# Patient Record
Sex: MALE | Race: OTHER | HISPANIC OR LATINO | Employment: FULL TIME | URBAN - METROPOLITAN AREA
[De-identification: names, ages, dates, MRNs, and addresses within clinical notes are randomized per-mention and may not be internally consistent; named-entity substitution may affect disease eponyms.]

---

## 2023-03-20 PROBLEM — E03.8 SUBCLINICAL HYPOTHYROIDISM: Status: ACTIVE | Noted: 2023-03-20

## 2023-03-20 PROBLEM — R06.02 SHORTNESS OF BREATH: Status: ACTIVE | Noted: 2023-03-20

## 2023-03-20 PROBLEM — I42.9 CARDIOMYOPATHY, UNSPECIFIED (MULTI): Status: ACTIVE | Noted: 2023-03-20

## 2023-03-20 PROBLEM — I51.3 ATRIAL THROMBUS: Status: ACTIVE | Noted: 2023-03-20

## 2023-03-20 PROBLEM — E55.9 VITAMIN D DEFICIENCY: Status: ACTIVE | Noted: 2023-03-20

## 2023-03-20 PROBLEM — I50.9 CONGESTIVE HEART FAILURE (MULTI): Status: ACTIVE | Noted: 2023-03-20

## 2023-03-20 PROBLEM — R09.82 PND (POST-NASAL DRIP): Status: ACTIVE | Noted: 2023-03-20

## 2023-03-20 PROBLEM — I48.92 ATRIAL FLUTTER (MULTI): Status: ACTIVE | Noted: 2023-03-20

## 2023-03-20 PROBLEM — E66.3 OVERWEIGHT WITH BODY MASS INDEX (BMI) OF 27 TO 27.9 IN ADULT: Status: ACTIVE | Noted: 2023-03-20

## 2023-03-20 PROBLEM — K46.9 ABDOMINAL HERNIA: Status: ACTIVE | Noted: 2023-03-20

## 2023-03-20 PROBLEM — I25.10 CORONARY ARTERY DISEASE: Status: ACTIVE | Noted: 2023-03-20

## 2023-03-20 PROBLEM — I10 PRIMARY HYPERTENSION: Status: ACTIVE | Noted: 2023-03-20

## 2023-03-20 PROBLEM — E78.5 HYPERLIPIDEMIA, UNSPECIFIED: Status: ACTIVE | Noted: 2023-03-20

## 2023-03-20 PROBLEM — I26.99 PULMONARY EMBOLISM (MULTI): Status: ACTIVE | Noted: 2023-03-20

## 2023-03-20 RX ORDER — FUROSEMIDE 40 MG/1
1 TABLET ORAL DAILY
COMMUNITY
End: 2023-10-23 | Stop reason: ALTCHOICE

## 2023-03-20 RX ORDER — ERGOCALCIFEROL 1.25 MG/1
1.25 CAPSULE ORAL
COMMUNITY

## 2023-03-20 RX ORDER — METOPROLOL SUCCINATE 200 MG/1
1 TABLET, EXTENDED RELEASE ORAL DAILY
COMMUNITY
End: 2024-04-11 | Stop reason: SDUPTHER

## 2023-03-20 RX ORDER — ROSUVASTATIN CALCIUM 20 MG/1
20 TABLET, COATED ORAL DAILY
COMMUNITY
End: 2023-10-19 | Stop reason: HOSPADM

## 2023-03-20 RX ORDER — SACUBITRIL AND VALSARTAN 49; 51 MG/1; MG/1
1 TABLET, FILM COATED ORAL 2 TIMES DAILY
COMMUNITY
End: 2024-02-05 | Stop reason: WASHOUT

## 2023-03-20 RX ORDER — LEVOTHYROXINE SODIUM 25 UG/1
25 TABLET ORAL DAILY
COMMUNITY
End: 2023-07-27 | Stop reason: SDUPTHER

## 2023-03-20 RX ORDER — FLUTICASONE PROPIONATE 50 MCG
2 SPRAY, SUSPENSION (ML) NASAL DAILY PRN
COMMUNITY
End: 2023-10-23 | Stop reason: ALTCHOICE

## 2023-03-21 ENCOUNTER — OFFICE VISIT (OUTPATIENT)
Dept: PRIMARY CARE | Facility: CLINIC | Age: 58
End: 2023-03-21
Payer: COMMERCIAL

## 2023-03-21 ENCOUNTER — APPOINTMENT (OUTPATIENT)
Dept: PRIMARY CARE | Facility: CLINIC | Age: 58
End: 2023-03-21
Payer: COMMERCIAL

## 2023-03-21 VITALS
WEIGHT: 184.8 LBS | DIASTOLIC BLOOD PRESSURE: 84 MMHG | TEMPERATURE: 98.3 F | SYSTOLIC BLOOD PRESSURE: 146 MMHG | HEART RATE: 80 BPM | OXYGEN SATURATION: 95 %

## 2023-03-21 DIAGNOSIS — J18.9 COMMUNITY ACQUIRED PNEUMONIA OF LEFT LOWER LOBE OF LUNG: ICD-10-CM

## 2023-03-21 DIAGNOSIS — I50.9 ACUTE ON CHRONIC CONGESTIVE HEART FAILURE, UNSPECIFIED HEART FAILURE TYPE (MULTI): ICD-10-CM

## 2023-03-21 DIAGNOSIS — J90 PLEURAL EFFUSION, BILATERAL: Primary | ICD-10-CM

## 2023-03-21 DIAGNOSIS — R09.89 BILATERAL RALES: ICD-10-CM

## 2023-03-21 DIAGNOSIS — I10 PRIMARY HYPERTENSION: ICD-10-CM

## 2023-03-21 PROCEDURE — 1036F TOBACCO NON-USER: CPT | Performed by: NURSE PRACTITIONER

## 2023-03-21 PROCEDURE — 3079F DIAST BP 80-89 MM HG: CPT | Performed by: NURSE PRACTITIONER

## 2023-03-21 PROCEDURE — 99213 OFFICE O/P EST LOW 20 MIN: CPT | Performed by: NURSE PRACTITIONER

## 2023-03-21 PROCEDURE — 3077F SYST BP >= 140 MM HG: CPT | Performed by: NURSE PRACTITIONER

## 2023-03-21 RX ORDER — ACETAMINOPHEN 325 MG/1
TABLET ORAL EVERY 6 HOURS PRN
COMMUNITY
Start: 2022-09-22 | End: 2023-10-23 | Stop reason: ALTCHOICE

## 2023-03-21 RX ORDER — AMIODARONE HYDROCHLORIDE 200 MG/1
1 TABLET ORAL DAILY
COMMUNITY
Start: 2023-03-08 | End: 2024-03-05 | Stop reason: ALTCHOICE

## 2023-03-21 RX ORDER — AMLODIPINE BESYLATE 5 MG/1
1 TABLET ORAL DAILY
Qty: 29 TABLET | Refills: 0 | COMMUNITY
Start: 2023-03-18 | End: 2023-11-30 | Stop reason: SDUPTHER

## 2023-03-21 RX ORDER — BENZONATATE 100 MG/1
CAPSULE ORAL
COMMUNITY
Start: 2023-03-17 | End: 2023-03-23

## 2023-03-21 RX ORDER — AMOXICILLIN AND CLAVULANATE POTASSIUM 875; 125 MG/1; MG/1
TABLET, FILM COATED ORAL 2 TIMES DAILY
COMMUNITY
Start: 2023-03-17 | End: 2023-03-23

## 2023-03-21 RX ORDER — GUAIFENESIN 600 MG/1
TABLET, EXTENDED RELEASE ORAL EVERY 12 HOURS
COMMUNITY
Start: 2023-03-17 | End: 2023-03-30

## 2023-03-21 RX ORDER — ALBUTEROL SULFATE 0.83 MG/ML
2.5 SOLUTION RESPIRATORY (INHALATION) ONCE
Status: COMPLETED | OUTPATIENT
Start: 2023-03-21 | End: 2023-03-28

## 2023-03-21 RX ORDER — ALBUTEROL SULFATE 90 UG/1
AEROSOL, METERED RESPIRATORY (INHALATION) EVERY 6 HOURS
COMMUNITY
Start: 2023-03-17 | End: 2023-10-23 | Stop reason: ALTCHOICE

## 2023-03-21 ASSESSMENT — PAIN SCALES - GENERAL: PAINLEVEL: 0-NO PAIN

## 2023-03-21 NOTE — LETTER
March 21, 2023     Patient: Vladimir Forrester   YOB: 1965   Date of Visit: 3/21/2023       To Whom It May Concern:    Vladimir Forrester was seen in my clinic on 3/21/2023 at 3:00 pm.  Vladimir is cleared to return to work pending any changes    If you have any questions or concerns, please don't hesitate to call.         Sincerely,         Criselda Hawley, APRN-CNP        CC: No Recipients

## 2023-03-21 NOTE — PROGRESS NOTES
Subjective   Patient ID: Vladimir Forrester is a 57 y.o. male who presents for Hospital Follow-up.    HPI     Review of Systems    Objective   /84 (BP Location: Right arm, Patient Position: Sitting, BP Cuff Size: Adult)   Pulse 80   Temp 36.8 °C (98.3 °F)   Wt 83.8 kg (184 lb 12.8 oz)   SpO2 95%     Physical Exam    Assessment/Plan

## 2023-03-21 NOTE — LETTER
March 28, 2023     Patient: Vladimir Forrester   YOB: 1965   Date of Visit: 3/21/2023       To Whom It May Concern:    Vladimir Forrester was seen in my clinic on 3/21/2023 at 3:00 pm.  Vladimir is cleared to return to work on 3/23/2023 pending on any changes.    If you have any questions or concerns, please don't hesitate to call.         Sincerely,         Criselda Hawley, APRN-CNP        CC: No Recipients

## 2023-03-21 NOTE — PATIENT INSTRUCTIONS
Use albuterol inhaler two puffs in the morning, 2 puffs in the afternoon  You have been referred to the Ambulatory Pharmacist Clinic Program, they will call you to schedule  Follow up with Cardiology as scheduled    Follow up in 3 months    You are encouraged to follow a heart healthy diet, read food labels and limit sodium to 2-4 gram per day. Avoid lunch meats, processed meats like sood and hot dogs, and processed foods which are high in added salt. Try to limit all sources of caffeine, and increase your intake of water, fruits and vegetables. Daily exercise and not smoking are also good ways to lower blood pressure.    If you are prescribed medication take it at the same time every day and do not skip doses.    If you check your blood pressure at home perform at the same time every day and under the same conditions. Both feet flat on floor and left arm at heart level for the most accurate reading. Bring your numbers with you to your next visit.

## 2023-03-28 PROCEDURE — 94640 AIRWAY INHALATION TREATMENT: CPT | Performed by: NURSE PRACTITIONER

## 2023-03-28 RX ADMIN — ALBUTEROL SULFATE 2.5 MG: 0.83 SOLUTION RESPIRATORY (INHALATION) at 10:28

## 2023-04-11 ENCOUNTER — APPOINTMENT (OUTPATIENT)
Dept: PHARMACY | Facility: HOSPITAL | Age: 58
End: 2023-04-11
Payer: COMMERCIAL

## 2023-04-24 ENCOUNTER — TELEPHONE (OUTPATIENT)
Dept: PHARMACY | Facility: HOSPITAL | Age: 58
End: 2023-04-24
Payer: COMMERCIAL

## 2023-04-24 NOTE — TELEPHONE ENCOUNTER
Vladimir Forrester was referred to the [facility] Ambulatory Pharmacist Clinic program by Criselda Hawley, QUINTIN-CNP. This program, facilitated through the PCI/Pharmacist consult agreement, aims to reduce future hospitalizations associated with chronic conditions through medication optimization and monitoring post-discharge, as well as in between their regularly scheduled appointments.    The referral has been canceled because the patient declined Clinical Pharmacy Services.    If services are still requested, please place a new referral to the Clinical Pharmacy Team for further assistance with management of CHF.    Thank you,  Faustina Dean, PharmD

## 2023-04-25 NOTE — TELEPHONE ENCOUNTER
I reviewed the progress note and agree with the resident’s findings and plans as written. Case discussed with resident.    Dayton Barger, MayoD

## 2023-05-11 ENCOUNTER — HOSPITAL ENCOUNTER (OUTPATIENT)
Dept: DATA CONVERSION | Facility: HOSPITAL | Age: 58
End: 2023-05-11
Attending: STUDENT IN AN ORGANIZED HEALTH CARE EDUCATION/TRAINING PROGRAM | Admitting: STUDENT IN AN ORGANIZED HEALTH CARE EDUCATION/TRAINING PROGRAM
Payer: COMMERCIAL

## 2023-05-11 DIAGNOSIS — Z79.01 LONG TERM (CURRENT) USE OF ANTICOAGULANTS: ICD-10-CM

## 2023-05-11 DIAGNOSIS — R07.89 OTHER CHEST PAIN: ICD-10-CM

## 2023-05-11 DIAGNOSIS — Z79.82 LONG TERM (CURRENT) USE OF ASPIRIN: ICD-10-CM

## 2023-05-11 DIAGNOSIS — I42.9 CARDIOMYOPATHY, UNSPECIFIED (MULTI): ICD-10-CM

## 2023-05-11 DIAGNOSIS — N18.9 CHRONIC KIDNEY DISEASE, UNSPECIFIED: ICD-10-CM

## 2023-05-11 DIAGNOSIS — E78.5 HYPERLIPIDEMIA, UNSPECIFIED: ICD-10-CM

## 2023-05-11 DIAGNOSIS — R94.31 ABNORMAL ELECTROCARDIOGRAM (ECG) (EKG): ICD-10-CM

## 2023-05-11 DIAGNOSIS — I50.30 UNSPECIFIED DIASTOLIC (CONGESTIVE) HEART FAILURE (MULTI): ICD-10-CM

## 2023-05-11 DIAGNOSIS — I25.2 OLD MYOCARDIAL INFARCTION: ICD-10-CM

## 2023-05-11 DIAGNOSIS — I20.0 UNSTABLE ANGINA (MULTI): ICD-10-CM

## 2023-05-11 DIAGNOSIS — I25.110 ATHEROSCLEROTIC HEART DISEASE OF NATIVE CORONARY ARTERY WITH UNSTABLE ANGINA PECTORIS (MULTI): ICD-10-CM

## 2023-05-11 DIAGNOSIS — I25.10 ATHEROSCLEROTIC HEART DISEASE OF NATIVE CORONARY ARTERY WITHOUT ANGINA PECTORIS: ICD-10-CM

## 2023-05-11 DIAGNOSIS — I13.0 HYPERTENSIVE HEART AND CHRONIC KIDNEY DISEASE WITH HEART FAILURE AND STAGE 1 THROUGH STAGE 4 CHRONIC KIDNEY DISEASE, OR UNSPECIFIED CHRONIC KIDNEY DISEASE (MULTI): ICD-10-CM

## 2023-05-11 LAB
ACTIVATED PARTIAL THROMBOPLASTIN TIME IN PPP BY COAGULATION ASSAY: 26 SEC (ref 26–39)
ANION GAP IN SER/PLAS: 12 MMOL/L (ref 10–20)
CALCIUM (MG/DL) IN SER/PLAS: 9.8 MG/DL (ref 8.6–10.3)
CARBON DIOXIDE, TOTAL (MMOL/L) IN SER/PLAS: 29 MMOL/L (ref 21–32)
CHLORIDE (MMOL/L) IN SER/PLAS: 104 MMOL/L (ref 98–107)
CREATININE (MG/DL) IN SER/PLAS: 1.67 MG/DL (ref 0.5–1.3)
ERYTHROCYTE DISTRIBUTION WIDTH (RATIO) BY AUTOMATED COUNT: 14 % (ref 11.5–14.5)
ERYTHROCYTE MEAN CORPUSCULAR HEMOGLOBIN CONCENTRATION (G/DL) BY AUTOMATED: 32.3 G/DL (ref 32–36)
ERYTHROCYTE MEAN CORPUSCULAR VOLUME (FL) BY AUTOMATED COUNT: 93 FL (ref 80–100)
ERYTHROCYTES (10*6/UL) IN BLOOD BY AUTOMATED COUNT: 4.47 X10E12/L (ref 4.5–5.9)
GFR MALE: 47 ML/MIN/1.73M2
GLUCOSE (MG/DL) IN SER/PLAS: 89 MG/DL (ref 74–99)
HEMATOCRIT (%) IN BLOOD BY AUTOMATED COUNT: 41.5 % (ref 41–52)
HEMOGLOBIN (G/DL) IN BLOOD: 13.4 G/DL (ref 13.5–17.5)
INR IN PPP BY COAGULATION ASSAY: 1 (ref 0.9–1.1)
LEUKOCYTES (10*3/UL) IN BLOOD BY AUTOMATED COUNT: 4.1 X10E9/L (ref 4.4–11.3)
NRBC (PER 100 WBCS) BY AUTOMATED COUNT: 0 /100 WBC (ref 0–0)
PLATELETS (10*3/UL) IN BLOOD AUTOMATED COUNT: 271 X10E9/L (ref 150–450)
POTASSIUM (MMOL/L) IN SER/PLAS: 3.8 MMOL/L (ref 3.5–5.3)
PROTHROMBIN TIME (PT) IN PPP BY COAGULATION ASSAY: 11.5 SEC (ref 9.8–13.4)
SODIUM (MMOL/L) IN SER/PLAS: 141 MMOL/L (ref 136–145)
UREA NITROGEN (MG/DL) IN SER/PLAS: 33 MG/DL (ref 6–23)

## 2023-05-14 LAB
ATRIAL RATE: 62 BPM
ATRIAL RATE: 72 BPM
P AXIS: 58 DEGREES
P AXIS: 63 DEGREES
P OFFSET: 108 MS
P OFFSET: 116 MS
P ONSET: 40 MS
P ONSET: 76 MS
PR INTERVAL: 284 MS
PR INTERVAL: 296 MS
Q ONSET: 206 MS
Q ONSET: 218 MS
QRS COUNT: 10 BEATS
QRS COUNT: 11 BEATS
QRS DURATION: 124 MS
QRS DURATION: 124 MS
QT INTERVAL: 454 MS
QT INTERVAL: 514 MS
QTC CALCULATION(BAZETT): 497 MS
QTC CALCULATION(BAZETT): 521 MS
QTC FREDERICIA: 482 MS
QTC FREDERICIA: 519 MS
R AXIS: 40 DEGREES
R AXIS: 53 DEGREES
T AXIS: 75 DEGREES
T AXIS: 92 DEGREES
T OFFSET: 433 MS
T OFFSET: 475 MS
VENTRICULAR RATE: 62 BPM
VENTRICULAR RATE: 72 BPM

## 2023-05-17 LAB
POC ACTIVATED CLOTTING TIME LOW RANGE: 259 SECONDS (ref 89–169)
POC ACTIVATED CLOTTING TIME LOW RANGE: 274 SECONDS (ref 89–169)

## 2023-05-18 PROBLEM — I50.43 ACUTE ON CHRONIC COMBINED SYSTOLIC AND DIASTOLIC HEART FAILURE (MULTI): Status: ACTIVE | Noted: 2023-05-18

## 2023-05-18 PROBLEM — R04.0 RIGHT-SIDED EPISTAXIS: Status: ACTIVE | Noted: 2023-05-18

## 2023-05-18 PROBLEM — R91.8 OPACITY OF LUNG ON IMAGING STUDY: Status: ACTIVE | Noted: 2023-05-18

## 2023-05-18 PROBLEM — Z95.5 STATUS POST INSERTION OF DRUG-ELUTING STENT INTO LEFT ANTERIOR DESCENDING ARTERY FOR CORONARY ARTERY DISEASE: Status: ACTIVE | Noted: 2023-05-18

## 2023-05-18 PROBLEM — R09.02 HYPOXIA: Status: ACTIVE | Noted: 2023-05-18

## 2023-05-18 PROBLEM — R91.8 MULTIPLE LUNG NODULES ON CT: Status: ACTIVE | Noted: 2023-05-18

## 2023-05-18 PROBLEM — R04.0 EPISTAXIS: Status: ACTIVE | Noted: 2023-05-18

## 2023-06-22 ENCOUNTER — APPOINTMENT (OUTPATIENT)
Dept: PRIMARY CARE | Facility: CLINIC | Age: 58
End: 2023-06-22
Payer: COMMERCIAL

## 2023-06-27 ENCOUNTER — OFFICE VISIT (OUTPATIENT)
Dept: PRIMARY CARE | Facility: CLINIC | Age: 58
End: 2023-06-27
Payer: COMMERCIAL

## 2023-06-27 VITALS
TEMPERATURE: 98.1 F | DIASTOLIC BLOOD PRESSURE: 64 MMHG | OXYGEN SATURATION: 99 % | WEIGHT: 179.6 LBS | BODY MASS INDEX: 28.55 KG/M2 | HEART RATE: 64 BPM | SYSTOLIC BLOOD PRESSURE: 128 MMHG

## 2023-06-27 DIAGNOSIS — E03.8 SUBCLINICAL HYPOTHYROIDISM: Primary | ICD-10-CM

## 2023-06-27 DIAGNOSIS — R91.8 MULTIPLE LUNG NODULES ON CT: ICD-10-CM

## 2023-06-27 DIAGNOSIS — I50.43 ACUTE ON CHRONIC COMBINED SYSTOLIC AND DIASTOLIC HEART FAILURE (MULTI): ICD-10-CM

## 2023-06-27 LAB — THYROTROPIN (MIU/L) IN SER/PLAS BY DETECTION LIMIT <= 0.05 MIU/L: 3.65 MIU/L (ref 0.44–3.98)

## 2023-06-27 PROCEDURE — 99213 OFFICE O/P EST LOW 20 MIN: CPT | Performed by: NURSE PRACTITIONER

## 2023-06-27 PROCEDURE — 3074F SYST BP LT 130 MM HG: CPT | Performed by: NURSE PRACTITIONER

## 2023-06-27 PROCEDURE — 84443 ASSAY THYROID STIM HORMONE: CPT

## 2023-06-27 PROCEDURE — 1036F TOBACCO NON-USER: CPT | Performed by: NURSE PRACTITIONER

## 2023-06-27 PROCEDURE — 3008F BODY MASS INDEX DOCD: CPT | Performed by: NURSE PRACTITIONER

## 2023-06-27 PROCEDURE — 3078F DIAST BP <80 MM HG: CPT | Performed by: NURSE PRACTITIONER

## 2023-06-27 ASSESSMENT — PAIN SCALES - GENERAL: PAINLEVEL: 0-NO PAIN

## 2023-06-27 NOTE — PROGRESS NOTES
Subjective   Patient ID: Vladimir Forrester is a 57 y.o. male who presents for Follow-up.    HPI     Review of Systems    Objective   /64 (BP Location: Left arm, Patient Position: Sitting, BP Cuff Size: Large adult)   Pulse 64   Temp 36.7 °C (98.1 °F) (Temporal)   Wt 81.5 kg (179 lb 9.6 oz)   SpO2 99%   BMI 28.55 kg/m²     Physical Exam    Assessment/Plan

## 2023-06-27 NOTE — PROGRESS NOTES
Subjective   Patient ID: Vladimir Forrester is a 57 y.o. male who presents for Follow-up.    HPI   Pleasant established patient with PMH of CAP, PE, CHF, HTN,  EF 20% here for follow up  Feeling well, has gone back to work and is in need of [facility] paperwork which we will fill in for him.     Hypothyroid last TSH 8.95 Feb 2023, started Levothyroxine. Repeat draw today to check levels    DANTE cardioversion Oct 2022, SR, doing well on Eliquis, Entresto and Toprol. Denies chest pain, shortness of breath, no edema. Follows with Dr Vieyra    Scheduled for Thoracic surgeon evaluation in July for   Hilar, mediastinal, supraclavicular lymphadenopathy, not palpable. Also follows with Lung Nodule clinic for lung nodules, PE    Review of Systems  Review of Systems   Constitutional: Negative.    Respiratory: Negative.     Cardiovascular: Negative.    Gastrointestinal: Negative.    Musculoskeletal: Negative.    Psychiatric/Behavioral: Negative.     All other systems reviewed and are negative.    Objective   /64 (BP Location: Left arm, Patient Position: Sitting, BP Cuff Size: Large adult)   Pulse 64   Temp 36.7 °C (98.1 °F) (Temporal)   Wt 81.5 kg (179 lb 9.6 oz)   SpO2 99%   BMI 28.55 kg/m²     Physical Exam  Physical Exam  Vitals reviewed.   Constitutional:       General:  active.   Cardiovascular:      Rate and Rhythm: Normal rate and regular rhythm.   Pulmonary:      Effort: Pulmonary effort is normal.      Breath sounds: Normal breath sounds.   Abdominal:      General: Bowel sounds are normal.   Musculoskeletal:         General: Normal range of motion.      Cervical back: Neck supple.   Neurological:      General: No focal deficit present.      Mental Status: She is alert.     Assessment/Plan   Problem List Items Addressed This Visit       Subclinical hypothyroidism     Levothyroxine 25 mcg  TSH 8.95 Feb 2023         Relevant Orders    TSH    Acute on chronic combined systolic and diastolic heart failure (CMS/HCC) -  Primary     Cardiology manages Dr Vieyra  EF 20%  S/p DANTE cardioversion Oct 2022         Multiple lung nodules on CT     Pulmonology, Lung nodule Clinic

## 2023-06-27 NOTE — LETTER
June 27, 2023     Patient: Vladimir Forrester   YOB: 1965   Date of Visit: 6/27/2023       To Whom It May Concern:    Vladimir Forrester was seen in my clinic on 6/27/2023 at 9:15 am. Please excuse Vladimir for his absence from work on this day to make the appointment.    If you have any questions or concerns, please don't hesitate to call.         Sincerely,         Criselda Hawley, QUINTIN-CNP        CC: No Recipients

## 2023-07-01 LAB
ALANINE AMINOTRANSFERASE (SGPT) (U/L) IN SER/PLAS: 29 U/L (ref 10–52)
ALBUMIN (G/DL) IN SER/PLAS: 3.7 G/DL (ref 3.4–5)
ALKALINE PHOSPHATASE (U/L) IN SER/PLAS: 87 U/L (ref 33–120)
ANION GAP IN SER/PLAS: 10 MMOL/L (ref 10–20)
ASPARTATE AMINOTRANSFERASE (SGOT) (U/L) IN SER/PLAS: 30 U/L (ref 9–39)
BASOPHILS (10*3/UL) IN BLOOD BY AUTOMATED COUNT: 0.06 X10E9/L (ref 0–0.1)
BASOPHILS/100 LEUKOCYTES IN BLOOD BY AUTOMATED COUNT: 1.2 % (ref 0–2)
BILIRUBIN TOTAL (MG/DL) IN SER/PLAS: 0.3 MG/DL (ref 0–1.2)
CALCIUM (MG/DL) IN SER/PLAS: 12.1 MG/DL (ref 8.6–10.3)
CARBON DIOXIDE, TOTAL (MMOL/L) IN SER/PLAS: 31 MMOL/L (ref 21–32)
CHLORIDE (MMOL/L) IN SER/PLAS: 104 MMOL/L (ref 98–107)
CHOLESTEROL (MG/DL) IN SER/PLAS: 100 MG/DL (ref 0–199)
CHOLESTEROL IN HDL (MG/DL) IN SER/PLAS: 42.1 MG/DL
CHOLESTEROL/HDL RATIO: 2.4
CREATININE (MG/DL) IN SER/PLAS: 2.83 MG/DL (ref 0.5–1.3)
EOSINOPHILS (10*3/UL) IN BLOOD BY AUTOMATED COUNT: 0.35 X10E9/L (ref 0–0.7)
EOSINOPHILS/100 LEUKOCYTES IN BLOOD BY AUTOMATED COUNT: 7.3 % (ref 0–6)
ERYTHROCYTE DISTRIBUTION WIDTH (RATIO) BY AUTOMATED COUNT: 13.8 % (ref 11.5–14.5)
ERYTHROCYTE MEAN CORPUSCULAR HEMOGLOBIN CONCENTRATION (G/DL) BY AUTOMATED: 31.2 G/DL (ref 32–36)
ERYTHROCYTE MEAN CORPUSCULAR VOLUME (FL) BY AUTOMATED COUNT: 95 FL (ref 80–100)
ERYTHROCYTES (10*6/UL) IN BLOOD BY AUTOMATED COUNT: 4.19 X10E12/L (ref 4.5–5.9)
GFR MALE: 25 ML/MIN/1.73M2
GLUCOSE (MG/DL) IN SER/PLAS: 90 MG/DL (ref 74–99)
HEMATOCRIT (%) IN BLOOD BY AUTOMATED COUNT: 39.7 % (ref 41–52)
HEMOGLOBIN (G/DL) IN BLOOD: 12.4 G/DL (ref 13.5–17.5)
IMMATURE GRANULOCYTES/100 LEUKOCYTES IN BLOOD BY AUTOMATED COUNT: 0.2 % (ref 0–0.9)
INR IN PPP BY COAGULATION ASSAY: 1.3 (ref 0.9–1.1)
LDL: 45 MG/DL (ref 0–99)
LEUKOCYTES (10*3/UL) IN BLOOD BY AUTOMATED COUNT: 4.8 X10E9/L (ref 4.4–11.3)
LYMPHOCYTES (10*3/UL) IN BLOOD BY AUTOMATED COUNT: 0.93 X10E9/L (ref 1.2–4.8)
LYMPHOCYTES/100 LEUKOCYTES IN BLOOD BY AUTOMATED COUNT: 19.3 % (ref 13–44)
MONOCYTES (10*3/UL) IN BLOOD BY AUTOMATED COUNT: 1.19 X10E9/L (ref 0.1–1)
MONOCYTES/100 LEUKOCYTES IN BLOOD BY AUTOMATED COUNT: 24.7 % (ref 2–10)
NEUTROPHILS (10*3/UL) IN BLOOD BY AUTOMATED COUNT: 2.27 X10E9/L (ref 1.2–7.7)
NEUTROPHILS/100 LEUKOCYTES IN BLOOD BY AUTOMATED COUNT: 47.3 % (ref 40–80)
NRBC (PER 100 WBCS) BY AUTOMATED COUNT: 0 /100 WBC (ref 0–0)
PHOSPHATE (MG/DL) IN SER/PLAS: 3.8 MG/DL (ref 2.5–4.9)
PLATELETS (10*3/UL) IN BLOOD AUTOMATED COUNT: 252 X10E9/L (ref 150–450)
POTASSIUM (MMOL/L) IN SER/PLAS: 3.8 MMOL/L (ref 3.5–5.3)
PROTEIN TOTAL: 7.7 G/DL (ref 6.4–8.2)
PROTHROMBIN TIME (PT) IN PPP BY COAGULATION ASSAY: 14.9 SEC (ref 9.8–12.8)
SODIUM (MMOL/L) IN SER/PLAS: 141 MMOL/L (ref 136–145)
TRIGLYCERIDE (MG/DL) IN SER/PLAS: 66 MG/DL (ref 0–149)
UREA NITROGEN (MG/DL) IN SER/PLAS: 36 MG/DL (ref 6–23)
VLDL: 13 MG/DL (ref 0–40)

## 2023-07-17 ENCOUNTER — HOSPITAL ENCOUNTER (OUTPATIENT)
Dept: DATA CONVERSION | Facility: HOSPITAL | Age: 58
End: 2023-07-17
Attending: INTERNAL MEDICINE | Admitting: INTERNAL MEDICINE
Payer: COMMERCIAL

## 2023-07-17 DIAGNOSIS — I88.8 OTHER NONSPECIFIC LYMPHADENITIS: ICD-10-CM

## 2023-07-17 DIAGNOSIS — R59.0 LOCALIZED ENLARGED LYMPH NODES: ICD-10-CM

## 2023-07-20 LAB
ANION GAP IN SER/PLAS: 14 MMOL/L (ref 10–20)
CALCIUM (MG/DL) IN SER/PLAS: 13.2 MG/DL (ref 8.6–10.3)
CARBON DIOXIDE, TOTAL (MMOL/L) IN SER/PLAS: 25 MMOL/L (ref 21–32)
CHLORIDE (MMOL/L) IN SER/PLAS: 104 MMOL/L (ref 98–107)
COMPLETE PATHOLOGY REPORT: NORMAL
CONVERTED CLINICAL DIAGNOSIS-HISTORY: NORMAL
CONVERTED FINAL DIAGNOSIS: NORMAL
CONVERTED FINAL REPORT PDF LINK TO COPY AND PASTE: NORMAL
CONVERTED GROSS DESCRIPTION: NORMAL
CREATININE (MG/DL) IN SER/PLAS: 3.48 MG/DL (ref 0.5–1.3)
GFR MALE: 19 ML/MIN/1.73M2
GLUCOSE (MG/DL) IN SER/PLAS: 82 MG/DL (ref 74–99)
NATRIURETIC PEPTIDE B (PG/ML) IN SER/PLAS: 522 PG/ML (ref 0–99)
POTASSIUM (MMOL/L) IN SER/PLAS: 3.4 MMOL/L (ref 3.5–5.3)
SODIUM (MMOL/L) IN SER/PLAS: 140 MMOL/L (ref 136–145)
UREA NITROGEN (MG/DL) IN SER/PLAS: 36 MG/DL (ref 6–23)

## 2023-07-25 LAB
CCOLL: NORMAL PER TUBE
CCOUN: 0.05 X10E9/L
CUTAN: NORMAL
FCTOR: NORMAL
FCTSO: NORMAL
FSITE: NORMAL
GPERC: 16 %
LCD19: 9 % OF LYMPH
LCD4: 59 % OF LYMPH
LCD8: 11 % OF LYMPH
LGPD1: NORMAL
LGPNO: NORMAL
LNK: 9 % OF LYMPH
LPERC: 33 %
MPERC: 2 %
PV192: NORMAL
VIAB: NORMAL

## 2023-07-27 DIAGNOSIS — E03.8 SUBCLINICAL HYPOTHYROIDISM: Primary | ICD-10-CM

## 2023-07-27 PROBLEM — R59.9 LYMPH NODES ENLARGED: Status: ACTIVE | Noted: 2023-07-27

## 2023-07-27 PROBLEM — E66.9 OBESITY, CLASS I, BMI 30-34.9: Status: ACTIVE | Noted: 2019-03-11

## 2023-07-27 PROBLEM — R94.8 ABNORMAL POSITRON EMISSION TOMOGRAPHY (PET) SCAN: Status: ACTIVE | Noted: 2023-07-27

## 2023-07-27 PROBLEM — I48.20 CHRONIC ATRIAL FIBRILLATION (MULTI): Status: ACTIVE | Noted: 2022-09-22

## 2023-07-27 PROBLEM — E66.811 OBESITY, CLASS I, BMI 30-34.9: Status: ACTIVE | Noted: 2019-03-11

## 2023-07-27 PROBLEM — G95.20 SCC (SPINAL CORD COMPRESSION) (MULTI): Status: ACTIVE | Noted: 2023-07-27

## 2023-07-27 PROBLEM — S62.624A CLOSED DISPLACED FRACTURE OF MIDDLE PHALANX OF RIGHT RING FINGER: Status: ACTIVE | Noted: 2018-12-02

## 2023-07-27 RX ORDER — LEVOTHYROXINE SODIUM 25 UG/1
25 TABLET ORAL DAILY
Qty: 90 TABLET | Refills: 3 | Status: SHIPPED | OUTPATIENT
Start: 2023-07-27

## 2023-08-22 LAB
ANION GAP IN SER/PLAS: 12 MMOL/L (ref 10–20)
APPEARANCE, URINE: ABNORMAL
BILIRUBIN, URINE: NEGATIVE
BLOOD, URINE: ABNORMAL
CALCIDIOL (25 OH VITAMIN D3) (NG/ML) IN SER/PLAS: 29 NG/ML
CALCIUM (MG/DL) IN SER/PLAS: 11.4 MG/DL (ref 8.6–10.3)
CARBON DIOXIDE, TOTAL (MMOL/L) IN SER/PLAS: 30 MMOL/L (ref 21–32)
CHLORIDE (MMOL/L) IN SER/PLAS: 105 MMOL/L (ref 98–107)
COLOR, URINE: ABNORMAL
CREATININE (MG/DL) IN SER/PLAS: 3.46 MG/DL (ref 0.5–1.3)
CREATININE (MG/DL) IN URINE: 105 MG/DL (ref 20–370)
GFR MALE: 20 ML/MIN/1.73M2
GLUCOSE (MG/DL) IN SER/PLAS: 89 MG/DL (ref 74–99)
GLUCOSE, URINE: NEGATIVE MG/DL
KETONES, URINE: NEGATIVE MG/DL
LEUKOCYTE ESTERASE, URINE: ABNORMAL
NITRITE, URINE: NEGATIVE
PARATHYRIN INTACT (PG/ML) IN SER/PLAS: 8.1 PG/ML (ref 18.5–88)
PH, URINE: 6 (ref 5–8)
PHOSPHATE (MG/DL) IN SER/PLAS: 4.5 MG/DL (ref 2.5–4.9)
POTASSIUM (MMOL/L) IN SER/PLAS: 3.6 MMOL/L (ref 3.5–5.3)
PROTEIN (MG/DL) IN URINE: 101 MG/DL (ref 5–25)
PROTEIN, URINE: ABNORMAL MG/DL
PROTEIN/CREATININE (MG/MG) IN URINE: 0.96 MG/MG CREAT (ref 0–0.17)
RBC, URINE: >100 /HPF (ref 0–5)
SODIUM (MMOL/L) IN SER/PLAS: 143 MMOL/L (ref 136–145)
SPECIFIC GRAVITY, URINE: 1.01 (ref 1–1.03)
URATE (MG/DL) IN SER/PLAS: 9 MG/DL (ref 4–7.5)
UREA NITROGEN (MG/DL) IN SER/PLAS: 41 MG/DL (ref 6–23)
UROBILINOGEN, URINE: <2 MG/DL (ref 0–1.9)
WBC, URINE: ABNORMAL /HPF (ref 0–5)

## 2023-08-26 LAB
ANION GAP IN SER/PLAS: 11 MMOL/L (ref 10–20)
CALCIUM (MG/DL) IN SER/PLAS: 10.7 MG/DL (ref 8.6–10.3)
CARBON DIOXIDE, TOTAL (MMOL/L) IN SER/PLAS: 30 MMOL/L (ref 21–32)
CHLORIDE (MMOL/L) IN SER/PLAS: 103 MMOL/L (ref 98–107)
COMPLEMENT C3 (MG/DL) IN SER/PLAS: 138 MG/DL (ref 87–200)
COMPLEMENT C4 (MG/DL) IN SER/PLAS: 45 MG/DL (ref 10–50)
CREATININE (MG/DL) IN SER/PLAS: 2.72 MG/DL (ref 0.5–1.3)
GFR MALE: 26 ML/MIN/1.73M2
GLUCOSE (MG/DL) IN SER/PLAS: 93 MG/DL (ref 74–99)
HEPATITIS B VIRUS SURFACE AG PRESENCE IN SERUM: NONREACTIVE
HEPATITIS C VIRUS AB PRESENCE IN SERUM: NONREACTIVE
POTASSIUM (MMOL/L) IN SER/PLAS: 3.3 MMOL/L (ref 3.5–5.3)
SODIUM (MMOL/L) IN SER/PLAS: 141 MMOL/L (ref 136–145)
UREA NITROGEN (MG/DL) IN SER/PLAS: 39 MG/DL (ref 6–23)

## 2023-08-27 LAB
IMMUNOGLOBULIN LIGHT CHAINS KAPPA/LAMBDA (MASS RATIO) IN SERUM: 1.23 (ref 0.26–1.65)
IMMUNOGLOBULIN LIGHT CHAINS.KAPPA (MG/DL) IN SERUM: 12.63 MG/DL (ref 0.33–1.94)
IMMUNOGLOBULIN LIGHT CHAINS.LAMBDA (MG/DL) IN SERUM: 10.31 MG/DL (ref 0.57–2.63)

## 2023-08-28 LAB
ANA PATTERN: ABNORMAL
ANA TITER: ABNORMAL
ANTI-CENTROMERE: <0.2 AI
ANTI-CHROMATIN: <0.2 AI
ANTI-DNA (DS): <1 IU/ML
ANTI-JO-1 IGG: <0.2 AI
ANTI-NUCLEAR ANTIBODY (ANA): POSITIVE
ANTI-RIBOSOMAL P: <0.2 AI
ANTI-RNP: <0.2 AI
ANTI-SCL-70: <0.2 AI
ANTI-SM/RNP: <0.2 AI
ANTI-SM: <0.2 AI
ANTI-SSA: <0.2 AI
ANTI-SSB: <0.2 AI

## 2023-08-29 LAB — GLOMERULAR BASEMENT MEMBRANE ANTIBODY: 0 AU/ML (ref 0–19)

## 2023-08-30 LAB
ALBUMIN ELP: 3.7 G/DL (ref 3.4–5)
ALBUMIN/PROTEIN TOTAL (%) IN URINE BY ELECTROPHORESIS: 27.6 %
ALPHA 1 GLOBULIN/PROTEIN TOTAL (%) IN URINE BY ELECTROPHORESIS: 10.8 %
ALPHA 1: 0.4 G/DL (ref 0.2–0.6)
ALPHA 2 GLOBULIN/PROTEIN TOTAL (%) IN URINE BY ELECTROPHORESIS: 17.8 %
ALPHA 2: 0.8 G/DL (ref 0.4–1.1)
BETA GLOBULIN/PROTEIN TOTAL (%) IN URINE BY ELECTROPHORESIS: 22.9 %
BETA: 0.9 G/DL (ref 0.5–1.2)
GAMMA GLOBULIN/PROTEIN TOTAL (%) IN URINE BY ELECTROPHORESIS: 20.9 %
GAMMA GLOBULIN: 2 G/DL (ref 0.5–1.4)
IMMUNOFIXATION INTERPRETATION: NORMAL
PATH REVIEW - SERUM IMMUNOFIXATION: NORMAL
PATH REVIEW - URINE IMMUNOFIXATION: NORMAL
PATH REVIEW-SERUM PROTEIN ELECTROPHORESIS: NORMAL
PATH REVIEW-URINE PROTEIN ELECTROPHORESIS: NORMAL
PROTEIN (MG/DL) IN URINE: 77 MG/DL (ref 5–25)
PROTEIN ELECTROPHORESIS INTERPRETATION: ABNORMAL
PROTEIN TOTAL: 7.8 G/DL (ref 6.4–8.2)
SERUM IMMUNOFIXATION INTERPRETATION: NORMAL
UPEP INTERPRETATION: ABNORMAL

## 2023-08-31 LAB — PHOSPHOLIPASE A2 RECEPTOR,IGG: NORMAL

## 2023-09-01 LAB
ANCA IFA PATTERN: NORMAL
ANCA IFA TITER: NORMAL
MYELOPEROXIDASE (MPO) AB, IGG: 0 AU/ML (ref 0–19)
SERINE PROTEINASE 3 (PR3) AB, IGG: 0 AU/ML (ref 0–19)

## 2023-09-09 LAB
ANION GAP IN SER/PLAS: 11 MMOL/L (ref 10–20)
CALCIUM (MG/DL) IN SER/PLAS: 10.8 MG/DL (ref 8.6–10.3)
CARBON DIOXIDE, TOTAL (MMOL/L) IN SER/PLAS: 31 MMOL/L (ref 21–32)
CHLORIDE (MMOL/L) IN SER/PLAS: 102 MMOL/L (ref 98–107)
CREATININE (MG/DL) IN SER/PLAS: 2.4 MG/DL (ref 0.5–1.3)
ERYTHROCYTE DISTRIBUTION WIDTH (RATIO) BY AUTOMATED COUNT: 14.4 % (ref 11.5–14.5)
ERYTHROCYTE MEAN CORPUSCULAR HEMOGLOBIN CONCENTRATION (G/DL) BY AUTOMATED: 32.4 G/DL (ref 32–36)
ERYTHROCYTE MEAN CORPUSCULAR VOLUME (FL) BY AUTOMATED COUNT: 94 FL (ref 80–100)
ERYTHROCYTES (10*6/UL) IN BLOOD BY AUTOMATED COUNT: 3.97 X10E12/L (ref 4.5–5.9)
GFR MALE: 30 ML/MIN/1.73M2
GLUCOSE (MG/DL) IN SER/PLAS: 91 MG/DL (ref 74–99)
HEMATOCRIT (%) IN BLOOD BY AUTOMATED COUNT: 37.4 % (ref 41–52)
HEMOGLOBIN (G/DL) IN BLOOD: 12.1 G/DL (ref 13.5–17.5)
LEUKOCYTES (10*3/UL) IN BLOOD BY AUTOMATED COUNT: 4.9 X10E9/L (ref 4.4–11.3)
NRBC (PER 100 WBCS) BY AUTOMATED COUNT: 0 /100 WBC (ref 0–0)
PLATELETS (10*3/UL) IN BLOOD AUTOMATED COUNT: 258 X10E9/L (ref 150–450)
POTASSIUM (MMOL/L) IN SER/PLAS: 3.3 MMOL/L (ref 3.5–5.3)
SODIUM (MMOL/L) IN SER/PLAS: 141 MMOL/L (ref 136–145)
UREA NITROGEN (MG/DL) IN SER/PLAS: 36 MG/DL (ref 6–23)

## 2023-09-13 LAB
ALBUMIN ELP: 3.8 G/DL (ref 3.4–5)
ALBUMIN/PROTEIN TOTAL (%) IN URINE BY ELECTROPHORESIS: 34.4 %
ALPHA 1 GLOBULIN/PROTEIN TOTAL (%) IN URINE BY ELECTROPHORESIS: 11.7 %
ALPHA 1: 0.4 G/DL (ref 0.2–0.6)
ALPHA 2 GLOBULIN/PROTEIN TOTAL (%) IN URINE BY ELECTROPHORESIS: 16.4 %
ALPHA 2: 0.9 G/DL (ref 0.4–1.1)
BETA GLOBULIN/PROTEIN TOTAL (%) IN URINE BY ELECTROPHORESIS: 20.7 %
BETA: 0.9 G/DL (ref 0.5–1.2)
GAMMA GLOBULIN/PROTEIN TOTAL (%) IN URINE BY ELECTROPHORESIS: 16.8 %
GAMMA GLOBULIN: 2.1 G/DL (ref 0.5–1.4)
IMMUNOFIXATION INTERPRETATION: NORMAL
PATH REVIEW - SERUM IMMUNOFIXATION: NORMAL
PATH REVIEW - URINE IMMUNOFIXATION: NORMAL
PATH REVIEW-SERUM PROTEIN ELECTROPHORESIS: NORMAL
PATH REVIEW-URINE PROTEIN ELECTROPHORESIS: NORMAL
PROTEIN (MG/DL) IN URINE: 64 MG/DL (ref 5–25)
PROTEIN ELECTROPHORESIS INTERPRETATION: ABNORMAL
PROTEIN TOTAL: 8.1 G/DL (ref 6.4–8.2)
SERUM IMMUNOFIXATION INTERPRETATION: NORMAL
UPEP INTERPRETATION: ABNORMAL

## 2023-09-14 NOTE — H&P
History & Physical Reviewed:   I have reviewed the History and Physical dated:  19-Apr-2023   History and Physical reviewed and relevant findings noted. Patient examined to review pertinent physical  findings.: No significant changes   Home Medications Reviewed: no changes noted   Allergies Reviewed: no changes noted       Airway/Sedation Assessment:  ·  Emotional Status calm   ·  Neurologic alert & oriented x 3   ·  Respiratory clear to auscultation   ·  Cardiovascular rhythm & rate regular   ·  GI/ soft, nontender     · Pulses present: Pedal Left, Pedal Right, Radial Left, Radial Right     ·  Mouth Opening OK yes   ·  Neck Flexibility OK yes   ·  Loose Teeth no     Oropharyngeal Classification:          ·  Oropharyngeal Classification Class II   ·  ASA PS Classification ASA III   ·  Sedation Plan moderate sedation       ERAS (Enhanced Recovery After Surgery):  ·  ERAS Patient: no     Consent:   COVID-19 Consent:  ·  COVID-19 Risk Consent Surgeon has reviewed key risks related to the risk of jackie COVID-19 and if they contract COVID-19 what the risks are.       Electronic Signatures:  Wilfrid Ricardo)   (Signed 11-May-2023 13:14)   Co-Signer: History & Physical Reviewed, Airway/Sedation, ERAS, Consent, Note Completion  Alexus Henry (APRN-CNP)   (Signed 11-May-2023 08:13)   Authored: History & Physical Reviewed, Note Completion, Airway/Sedation, ERAS, Consent    Last Updated: 11-May-2023 13:14 by Wilfrid Ricardo)

## 2023-09-28 ENCOUNTER — OFFICE VISIT (OUTPATIENT)
Dept: PRIMARY CARE | Facility: CLINIC | Age: 58
End: 2023-09-28
Payer: COMMERCIAL

## 2023-09-28 VITALS
HEART RATE: 82 BPM | DIASTOLIC BLOOD PRESSURE: 70 MMHG | BODY MASS INDEX: 28.11 KG/M2 | TEMPERATURE: 98.4 F | OXYGEN SATURATION: 95 % | WEIGHT: 176.8 LBS | SYSTOLIC BLOOD PRESSURE: 122 MMHG

## 2023-09-28 DIAGNOSIS — I50.43 ACUTE ON CHRONIC COMBINED SYSTOLIC AND DIASTOLIC HEART FAILURE (MULTI): ICD-10-CM

## 2023-09-28 DIAGNOSIS — E03.8 SUBCLINICAL HYPOTHYROIDISM: Primary | ICD-10-CM

## 2023-09-28 PROBLEM — N28.9 ABNORMAL KIDNEY FUNCTION: Status: ACTIVE | Noted: 2023-09-28

## 2023-09-28 PROBLEM — R31.9 HEMATURIA: Status: ACTIVE | Noted: 2023-09-28

## 2023-09-28 PROBLEM — N18.4 CKD (CHRONIC KIDNEY DISEASE), STAGE IV (MULTI): Status: ACTIVE | Noted: 2023-09-28

## 2023-09-28 PROBLEM — R80.9 PROTEINURIA: Status: ACTIVE | Noted: 2023-09-28

## 2023-09-28 LAB — THYROTROPIN (MIU/L) IN SER/PLAS BY DETECTION LIMIT <= 0.05 MIU/L: 3.83 MIU/L (ref 0.44–3.98)

## 2023-09-28 PROCEDURE — 3074F SYST BP LT 130 MM HG: CPT | Performed by: NURSE PRACTITIONER

## 2023-09-28 PROCEDURE — 1036F TOBACCO NON-USER: CPT | Performed by: NURSE PRACTITIONER

## 2023-09-28 PROCEDURE — 99212 OFFICE O/P EST SF 10 MIN: CPT | Performed by: NURSE PRACTITIONER

## 2023-09-28 PROCEDURE — 3078F DIAST BP <80 MM HG: CPT | Performed by: NURSE PRACTITIONER

## 2023-09-28 PROCEDURE — 3008F BODY MASS INDEX DOCD: CPT | Performed by: NURSE PRACTITIONER

## 2023-09-28 PROCEDURE — 84443 ASSAY THYROID STIM HORMONE: CPT

## 2023-09-28 PROCEDURE — 36415 COLL VENOUS BLD VENIPUNCTURE: CPT

## 2023-09-28 RX ORDER — DAPAGLIFLOZIN 10 MG/1
1 TABLET, FILM COATED ORAL
COMMUNITY
Start: 2023-09-26

## 2023-09-28 RX ORDER — VALSARTAN 80 MG/1
1 TABLET ORAL DAILY
COMMUNITY
Start: 2023-09-26 | End: 2023-10-19 | Stop reason: HOSPADM

## 2023-09-28 ASSESSMENT — PAIN SCALES - GENERAL: PAINLEVEL: 0-NO PAIN

## 2023-09-28 NOTE — PROGRESS NOTES
Subjective   Patient ID: Vladimir Forrester is a 57 y.o. male who presents for Follow-up.    HPI   Pleasant established here for follow on hypothyroid.  TSH 3.65<<8.95<<4.13. Currently Levothyroxine 25 mcg. Denies dry skin, constipation, brain fog, feels well.  Depressed as his 92 y/o mom passed away last Thursday    A on C CHF EF 20%, follows with Cardiology regularly, pressures well controlled. No current concerns  CKD 4 recently started Farxiga, managed by Nephrology, Dr Nagy    Review of Systems  Review of Systems   Constitutional: Negative.    HENT: Negative.     Respiratory: Negative.     Cardiovascular: Negative.    Gastrointestinal: Negative.    Genitourinary: Negative.    Musculoskeletal: Negative.    Psychiatric/Behavioral: Negative.     All other systems reviewed and are negative.    Objective   /70 (BP Location: Left arm, Patient Position: Sitting, BP Cuff Size: Adult)   Pulse 82   Temp 36.9 °C (98.4 °F) (Temporal)   Wt 80.2 kg (176 lb 12.8 oz)   SpO2 95%   BMI 28.11 kg/m²     Physical Exam  Vitals reviewed.   Constitutional:       Appearance: Normal appearance.   Cardiovascular:      Rate and Rhythm: Normal rate and regular rhythm.   Pulmonary:      Effort: Pulmonary effort is normal.      Breath sounds: Normal breath sounds.   Musculoskeletal:         General: Normal range of motion.      Cervical back: Neck supple.   Psychiatric:         Mood and Affect: Mood normal.         Assessment/Plan   Problem List Items Addressed This Visit             ICD-10-CM    Subclinical hypothyroidism - Primary E03.8    Relevant Orders    TSH    Acute on chronic combined systolic and diastolic heart failure (CMS/HCC) I50.43

## 2023-10-17 ENCOUNTER — HOSPITAL ENCOUNTER (INPATIENT)
Facility: HOSPITAL | Age: 58
LOS: 2 days | Discharge: HOME | End: 2023-10-19
Attending: EMERGENCY MEDICINE | Admitting: INTERNAL MEDICINE
Payer: COMMERCIAL

## 2023-10-17 ENCOUNTER — APPOINTMENT (OUTPATIENT)
Dept: RADIOLOGY | Facility: HOSPITAL | Age: 58
End: 2023-10-17
Payer: COMMERCIAL

## 2023-10-17 ENCOUNTER — APPOINTMENT (OUTPATIENT)
Dept: CARDIOLOGY | Facility: HOSPITAL | Age: 58
End: 2023-10-17
Payer: COMMERCIAL

## 2023-10-17 DIAGNOSIS — R80.9 PROTEINURIA: ICD-10-CM

## 2023-10-17 DIAGNOSIS — I25.10 CORONARY ARTERY DISEASE INVOLVING NATIVE CORONARY ARTERY OF NATIVE HEART WITHOUT ANGINA PECTORIS: ICD-10-CM

## 2023-10-17 DIAGNOSIS — I21.4 NSTEMI (NON-ST ELEVATED MYOCARDIAL INFARCTION) (MULTI): ICD-10-CM

## 2023-10-17 DIAGNOSIS — I21.3 STEMI (ST ELEVATION MYOCARDIAL INFARCTION) (MULTI): ICD-10-CM

## 2023-10-17 DIAGNOSIS — I25.10 CORONARY ARTERY DISEASE: ICD-10-CM

## 2023-10-17 DIAGNOSIS — I21.3 ST ELEVATION MYOCARDIAL INFARCTION (STEMI), UNSPECIFIED ARTERY (MULTI): Primary | ICD-10-CM

## 2023-10-17 LAB
ALBUMIN SERPL BCP-MCNC: 3.6 G/DL (ref 3.4–5)
ALBUMIN SERPL BCP-MCNC: 4.3 G/DL (ref 3.4–5)
ALP SERPL-CCNC: 66 U/L (ref 33–120)
ALP SERPL-CCNC: 87 U/L (ref 33–120)
ALT SERPL W P-5'-P-CCNC: 23 U/L (ref 10–52)
ALT SERPL W P-5'-P-CCNC: 26 U/L (ref 10–52)
ANION GAP SERPL CALC-SCNC: 10 MMOL/L (ref 10–20)
ANION GAP SERPL CALC-SCNC: 13 MMOL/L (ref 10–20)
APTT PPP: 33 SECONDS (ref 27–38)
AST SERPL W P-5'-P-CCNC: 24 U/L (ref 9–39)
AST SERPL W P-5'-P-CCNC: 27 U/L (ref 9–39)
BASOPHILS # BLD AUTO: 0.04 X10*3/UL (ref 0–0.1)
BASOPHILS NFR BLD AUTO: 0.7 %
BILIRUB SERPL-MCNC: 0.4 MG/DL (ref 0–1.2)
BILIRUB SERPL-MCNC: 0.4 MG/DL (ref 0–1.2)
BUN SERPL-MCNC: 42 MG/DL (ref 6–23)
BUN SERPL-MCNC: 45 MG/DL (ref 6–23)
CA-I BLD-SCNC: 1.61 MMOL/L (ref 1.1–1.33)
CALCIUM SERPL-MCNC: 11.5 MG/DL (ref 8.6–10.3)
CALCIUM SERPL-MCNC: 13 MG/DL (ref 8.6–10.3)
CARDIAC TROPONIN I PNL SERPL HS: 170 NG/L (ref 0–20)
CARDIAC TROPONIN I PNL SERPL HS: 178 NG/L (ref 0–20)
CHLORIDE SERPL-SCNC: 102 MMOL/L (ref 98–107)
CHLORIDE SERPL-SCNC: 106 MMOL/L (ref 98–107)
CO2 SERPL-SCNC: 25 MMOL/L (ref 21–32)
CO2 SERPL-SCNC: 26 MMOL/L (ref 21–32)
CREAT SERPL-MCNC: 2.69 MG/DL (ref 0.5–1.3)
CREAT SERPL-MCNC: 2.85 MG/DL (ref 0.5–1.3)
EJECTION FRACTION APICAL 4 CHAMBER: 52.1
EOSINOPHIL # BLD AUTO: 0.26 X10*3/UL (ref 0–0.7)
EOSINOPHIL NFR BLD AUTO: 4.6 %
ERYTHROCYTE [DISTWIDTH] IN BLOOD BY AUTOMATED COUNT: 13.4 % (ref 11.5–14.5)
GFR SERPL CREATININE-BSD FRML MDRD: 25 ML/MIN/1.73M*2
GFR SERPL CREATININE-BSD FRML MDRD: 27 ML/MIN/1.73M*2
GLUCOSE SERPL-MCNC: 114 MG/DL (ref 74–99)
GLUCOSE SERPL-MCNC: 81 MG/DL (ref 74–99)
HCT VFR BLD AUTO: 40.5 % (ref 41–52)
HGB BLD-MCNC: 13.8 G/DL (ref 13.5–17.5)
IMM GRANULOCYTES # BLD AUTO: 0.03 X10*3/UL (ref 0–0.7)
IMM GRANULOCYTES NFR BLD AUTO: 0.5 % (ref 0–0.9)
INR PPP: 1.2 (ref 0.9–1.1)
LYMPHOCYTES # BLD AUTO: 1.34 X10*3/UL (ref 1.2–4.8)
LYMPHOCYTES NFR BLD AUTO: 23.8 %
MAGNESIUM SERPL-MCNC: 2.25 MG/DL (ref 1.6–2.4)
MCH RBC QN AUTO: 30.9 PG (ref 26–34)
MCHC RBC AUTO-ENTMCNC: 34.1 G/DL (ref 32–36)
MCV RBC AUTO: 91 FL (ref 80–100)
MONOCYTES # BLD AUTO: 1.29 X10*3/UL (ref 0.1–1)
MONOCYTES NFR BLD AUTO: 22.9 %
NEUTROPHILS # BLD AUTO: 2.67 X10*3/UL (ref 1.2–7.7)
NEUTROPHILS NFR BLD AUTO: 47.5 %
NRBC BLD-RTO: 0 /100 WBCS (ref 0–0)
PLATELET # BLD AUTO: 282 X10*3/UL (ref 150–450)
PMV BLD AUTO: 9 FL (ref 7.5–11.5)
POTASSIUM SERPL-SCNC: 2.9 MMOL/L (ref 3.5–5.3)
POTASSIUM SERPL-SCNC: 3.2 MMOL/L (ref 3.5–5.3)
PROT SERPL-MCNC: 7.4 G/DL (ref 6.4–8.2)
PROT SERPL-MCNC: 8.5 G/DL (ref 6.4–8.2)
PROTHROMBIN TIME: 13.6 SECONDS (ref 9.8–12.8)
RBC # BLD AUTO: 4.47 X10*6/UL (ref 4.5–5.9)
SODIUM SERPL-SCNC: 137 MMOL/L (ref 136–145)
SODIUM SERPL-SCNC: 139 MMOL/L (ref 136–145)
WBC # BLD AUTO: 5.6 X10*3/UL (ref 4.4–11.3)

## 2023-10-17 PROCEDURE — 99223 1ST HOSP IP/OBS HIGH 75: CPT | Performed by: INTERNAL MEDICINE

## 2023-10-17 PROCEDURE — 75561 CARDIAC MRI FOR MORPH W/DYE: CPT | Performed by: STUDENT IN AN ORGANIZED HEALTH CARE EDUCATION/TRAINING PROGRAM

## 2023-10-17 PROCEDURE — 99291 CRITICAL CARE FIRST HOUR: CPT | Performed by: STUDENT IN AN ORGANIZED HEALTH CARE EDUCATION/TRAINING PROGRAM

## 2023-10-17 PROCEDURE — 93308 TTE F-UP OR LMTD: CPT

## 2023-10-17 PROCEDURE — 2500000002 HC RX 250 W HCPCS SELF ADMINISTERED DRUGS (ALT 637 FOR MEDICARE OP, ALT 636 FOR OP/ED): Performed by: STUDENT IN AN ORGANIZED HEALTH CARE EDUCATION/TRAINING PROGRAM

## 2023-10-17 PROCEDURE — 75565 CARD MRI VELOC FLOW MAPPING: CPT | Performed by: STUDENT IN AN ORGANIZED HEALTH CARE EDUCATION/TRAINING PROGRAM

## 2023-10-17 PROCEDURE — 2500000005 HC RX 250 GENERAL PHARMACY W/O HCPCS: Performed by: EMERGENCY MEDICINE

## 2023-10-17 PROCEDURE — 84484 ASSAY OF TROPONIN QUANT: CPT | Performed by: PHYSICIAN ASSISTANT

## 2023-10-17 PROCEDURE — 80053 COMPREHEN METABOLIC PANEL: CPT | Performed by: INTERNAL MEDICINE

## 2023-10-17 PROCEDURE — 2500000004 HC RX 250 GENERAL PHARMACY W/ HCPCS (ALT 636 FOR OP/ED): Performed by: INTERNAL MEDICINE

## 2023-10-17 PROCEDURE — 75561 CARDIAC MRI FOR MORPH W/DYE: CPT

## 2023-10-17 PROCEDURE — 96375 TX/PRO/DX INJ NEW DRUG ADDON: CPT

## 2023-10-17 PROCEDURE — 71045 X-RAY EXAM CHEST 1 VIEW: CPT | Performed by: RADIOLOGY

## 2023-10-17 PROCEDURE — 2500000001 HC RX 250 WO HCPCS SELF ADMINISTERED DRUGS (ALT 637 FOR MEDICARE OP): Performed by: PHYSICIAN ASSISTANT

## 2023-10-17 PROCEDURE — A9575 INJ GADOTERATE MEGLUMI 0.1ML: HCPCS | Performed by: INTERNAL MEDICINE

## 2023-10-17 PROCEDURE — 2550000001 HC RX 255 CONTRASTS: Performed by: INTERNAL MEDICINE

## 2023-10-17 PROCEDURE — 82330 ASSAY OF CALCIUM: CPT | Performed by: EMERGENCY MEDICINE

## 2023-10-17 PROCEDURE — 85610 PROTHROMBIN TIME: CPT | Performed by: PHYSICIAN ASSISTANT

## 2023-10-17 PROCEDURE — 80053 COMPREHEN METABOLIC PANEL: CPT | Performed by: PHYSICIAN ASSISTANT

## 2023-10-17 PROCEDURE — 85025 COMPLETE CBC W/AUTO DIFF WBC: CPT | Performed by: PHYSICIAN ASSISTANT

## 2023-10-17 PROCEDURE — 36415 COLL VENOUS BLD VENIPUNCTURE: CPT | Performed by: PHYSICIAN ASSISTANT

## 2023-10-17 PROCEDURE — 96365 THER/PROPH/DIAG IV INF INIT: CPT | Mod: 59

## 2023-10-17 PROCEDURE — 71045 X-RAY EXAM CHEST 1 VIEW: CPT | Mod: FY

## 2023-10-17 PROCEDURE — 2500000004 HC RX 250 GENERAL PHARMACY W/ HCPCS (ALT 636 FOR OP/ED): Performed by: STUDENT IN AN ORGANIZED HEALTH CARE EDUCATION/TRAINING PROGRAM

## 2023-10-17 PROCEDURE — 2500000004 HC RX 250 GENERAL PHARMACY W/ HCPCS (ALT 636 FOR OP/ED): Performed by: EMERGENCY MEDICINE

## 2023-10-17 PROCEDURE — 36415 COLL VENOUS BLD VENIPUNCTURE: CPT | Performed by: EMERGENCY MEDICINE

## 2023-10-17 PROCEDURE — 2020000001 HC ICU ROOM DAILY

## 2023-10-17 PROCEDURE — 99285 EMERGENCY DEPT VISIT HI MDM: CPT | Performed by: EMERGENCY MEDICINE

## 2023-10-17 PROCEDURE — 85730 THROMBOPLASTIN TIME PARTIAL: CPT | Performed by: PHYSICIAN ASSISTANT

## 2023-10-17 PROCEDURE — 93308 TTE F-UP OR LMTD: CPT | Performed by: STUDENT IN AN ORGANIZED HEALTH CARE EDUCATION/TRAINING PROGRAM

## 2023-10-17 PROCEDURE — 2500000004 HC RX 250 GENERAL PHARMACY W/ HCPCS (ALT 636 FOR OP/ED): Performed by: PHYSICIAN ASSISTANT

## 2023-10-17 PROCEDURE — 83735 ASSAY OF MAGNESIUM: CPT | Performed by: INTERNAL MEDICINE

## 2023-10-17 PROCEDURE — 96374 THER/PROPH/DIAG INJ IV PUSH: CPT

## 2023-10-17 RX ORDER — POTASSIUM CHLORIDE 14.9 MG/ML
20 INJECTION INTRAVENOUS ONCE
Status: COMPLETED | OUTPATIENT
Start: 2023-10-17 | End: 2023-10-17

## 2023-10-17 RX ORDER — NITROGLYCERIN 20 MG/100ML
5 INJECTION INTRAVENOUS CONTINUOUS
Status: DISCONTINUED | OUTPATIENT
Start: 2023-10-17 | End: 2023-10-19 | Stop reason: HOSPADM

## 2023-10-17 RX ORDER — AMLODIPINE BESYLATE 5 MG/1
5 TABLET ORAL DAILY
Status: DISCONTINUED | OUTPATIENT
Start: 2023-10-17 | End: 2023-10-19 | Stop reason: HOSPADM

## 2023-10-17 RX ORDER — SODIUM CHLORIDE 9 MG/ML
100 INJECTION, SOLUTION INTRAVENOUS CONTINUOUS
Status: DISCONTINUED | OUTPATIENT
Start: 2023-10-17 | End: 2023-10-18

## 2023-10-17 RX ORDER — CLOPIDOGREL BISULFATE 75 MG/1
75 TABLET ORAL DAILY
Status: DISCONTINUED | OUTPATIENT
Start: 2023-10-17 | End: 2023-10-19 | Stop reason: HOSPADM

## 2023-10-17 RX ORDER — LORAZEPAM 2 MG/ML
1 INJECTION INTRAMUSCULAR ONCE
Status: DISCONTINUED | OUTPATIENT
Start: 2023-10-17 | End: 2023-10-18

## 2023-10-17 RX ORDER — LEVOTHYROXINE SODIUM 25 UG/1
25 TABLET ORAL DAILY
Status: DISCONTINUED | OUTPATIENT
Start: 2023-10-17 | End: 2023-10-19 | Stop reason: HOSPADM

## 2023-10-17 RX ORDER — METOPROLOL SUCCINATE 50 MG/1
200 TABLET, EXTENDED RELEASE ORAL DAILY
Status: DISCONTINUED | OUTPATIENT
Start: 2023-10-17 | End: 2023-10-19 | Stop reason: HOSPADM

## 2023-10-17 RX ORDER — NAPROXEN SODIUM 220 MG/1
324 TABLET, FILM COATED ORAL ONCE
Status: COMPLETED | OUTPATIENT
Start: 2023-10-17 | End: 2023-10-17

## 2023-10-17 RX ORDER — GADOTERATE MEGLUMINE 376.9 MG/ML
22 INJECTION INTRAVENOUS
Status: COMPLETED | OUTPATIENT
Start: 2023-10-17 | End: 2023-10-17

## 2023-10-17 RX ORDER — ASPIRIN 81 MG/1
81 TABLET ORAL DAILY
Status: DISCONTINUED | OUTPATIENT
Start: 2023-10-17 | End: 2023-10-19 | Stop reason: HOSPADM

## 2023-10-17 RX ORDER — MORPHINE SULFATE 4 MG/ML
4 INJECTION, SOLUTION INTRAMUSCULAR; INTRAVENOUS ONCE
Status: COMPLETED | OUTPATIENT
Start: 2023-10-17 | End: 2023-10-17

## 2023-10-17 RX ORDER — ROSUVASTATIN CALCIUM 10 MG/1
40 TABLET, COATED ORAL DAILY
Status: DISCONTINUED | OUTPATIENT
Start: 2023-10-17 | End: 2023-10-19 | Stop reason: HOSPADM

## 2023-10-17 RX ORDER — POTASSIUM CHLORIDE 14.9 MG/ML
20 INJECTION INTRAVENOUS
Status: DISPENSED | OUTPATIENT
Start: 2023-10-17 | End: 2023-10-17

## 2023-10-17 RX ORDER — DIPHENHYDRAMINE HYDROCHLORIDE 50 MG/ML
25 INJECTION INTRAMUSCULAR; INTRAVENOUS ONCE
Status: COMPLETED | OUTPATIENT
Start: 2023-10-17 | End: 2023-10-17

## 2023-10-17 RX ORDER — AMIODARONE HYDROCHLORIDE 200 MG/1
200 TABLET ORAL 2 TIMES DAILY
Status: DISCONTINUED | OUTPATIENT
Start: 2023-10-17 | End: 2023-10-19 | Stop reason: HOSPADM

## 2023-10-17 RX ORDER — SODIUM CHLORIDE 9 MG/ML
100 INJECTION, SOLUTION INTRAVENOUS CONTINUOUS
Status: DISCONTINUED | OUTPATIENT
Start: 2023-10-18 | End: 2023-10-18

## 2023-10-17 RX ADMIN — GADOTERATE MEGLUMINE 22 ML: 376.9 INJECTION INTRAVENOUS at 15:41

## 2023-10-17 RX ADMIN — NITROGLYCERIN 5 MCG/MIN: 20 INJECTION INTRAVENOUS at 06:49

## 2023-10-17 RX ADMIN — SODIUM CHLORIDE 100 ML/HR: 9 INJECTION, SOLUTION INTRAVENOUS at 12:06

## 2023-10-17 RX ADMIN — MORPHINE SULFATE 4 MG: 4 INJECTION, SOLUTION INTRAMUSCULAR; INTRAVENOUS at 06:28

## 2023-10-17 RX ADMIN — ASPIRIN 81 MG 324 MG: 81 TABLET ORAL at 06:28

## 2023-10-17 RX ADMIN — POTASSIUM CHLORIDE 20 MEQ: 14.9 INJECTION, SOLUTION INTRAVENOUS at 17:01

## 2023-10-17 RX ADMIN — DIPHENHYDRAMINE HYDROCHLORIDE 25 MG: 50 INJECTION, SOLUTION INTRAMUSCULAR; INTRAVENOUS at 14:54

## 2023-10-17 RX ADMIN — SODIUM CHLORIDE 100 ML/HR: 9 INJECTION, SOLUTION INTRAVENOUS at 23:24

## 2023-10-17 RX ADMIN — AMIODARONE HYDROCHLORIDE 200 MG: 200 TABLET ORAL at 20:10

## 2023-10-17 RX ADMIN — POTASSIUM CHLORIDE 20 MEQ: 14.9 INJECTION, SOLUTION INTRAVENOUS at 07:50

## 2023-10-17 SDOH — HEALTH STABILITY: MENTAL HEALTH
STRESS IS WHEN SOMEONE FEELS TENSE, NERVOUS, ANXIOUS, OR CAN'T SLEEP AT NIGHT BECAUSE THEIR MIND IS TROUBLED. HOW STRESSED ARE YOU?: NOT AT ALL

## 2023-10-17 SDOH — SOCIAL STABILITY: SOCIAL INSECURITY: WERE YOU ABLE TO COMPLETE ALL THE BEHAVIORAL HEALTH SCREENINGS?: YES

## 2023-10-17 SDOH — SOCIAL STABILITY: SOCIAL INSECURITY
WITHIN THE LAST YEAR, HAVE YOU BEEN KICKED, HIT, SLAPPED, OR OTHERWISE PHYSICALLY HURT BY YOUR PARTNER OR EX-PARTNER?: NO

## 2023-10-17 SDOH — SOCIAL STABILITY: SOCIAL INSECURITY: ABUSE: ADULT

## 2023-10-17 SDOH — SOCIAL STABILITY: SOCIAL NETWORK: HOW OFTEN DO YOU ATTENT MEETINGS OF THE CLUB OR ORGANIZATION YOU BELONG TO?: NEVER

## 2023-10-17 SDOH — SOCIAL STABILITY: SOCIAL INSECURITY: ARE THERE ANY APPARENT SIGNS OF INJURIES/BEHAVIORS THAT COULD BE RELATED TO ABUSE/NEGLECT?: NO

## 2023-10-17 SDOH — SOCIAL STABILITY: SOCIAL INSECURITY
WITHIN THE LAST YEAR, HAVE TO BEEN RAPED OR FORCED TO HAVE ANY KIND OF SEXUAL ACTIVITY BY YOUR PARTNER OR EX-PARTNER?: NO

## 2023-10-17 SDOH — ECONOMIC STABILITY: FOOD INSECURITY: WITHIN THE PAST 12 MONTHS, YOU WORRIED THAT YOUR FOOD WOULD RUN OUT BEFORE YOU GOT MONEY TO BUY MORE.: NEVER TRUE

## 2023-10-17 SDOH — SOCIAL STABILITY: SOCIAL NETWORK: ARE YOU MARRIED, WIDOWED, DIVORCED, SEPARATED, NEVER MARRIED, OR LIVING WITH A PARTNER?: MARRIED

## 2023-10-17 SDOH — SOCIAL STABILITY: SOCIAL NETWORK
DO YOU BELONG TO ANY CLUBS OR ORGANIZATIONS SUCH AS CHURCH GROUPS UNIONS, FRATERNAL OR ATHLETIC GROUPS, OR SCHOOL GROUPS?: NO

## 2023-10-17 SDOH — ECONOMIC STABILITY: FOOD INSECURITY: WITHIN THE PAST 12 MONTHS, THE FOOD YOU BOUGHT JUST DIDN'T LAST AND YOU DIDN'T HAVE MONEY TO GET MORE.: NEVER TRUE

## 2023-10-17 SDOH — SOCIAL STABILITY: SOCIAL NETWORK: HOW OFTEN DO YOU GET TOGETHER WITH FRIENDS OR RELATIVES?: ONCE A WEEK

## 2023-10-17 SDOH — SOCIAL STABILITY: SOCIAL NETWORK: HOW OFTEN DO YOU ATTEND CHURCH OR RELIGIOUS SERVICES?: PATIENT DECLINED

## 2023-10-17 SDOH — SOCIAL STABILITY: SOCIAL NETWORK: IN A TYPICAL WEEK, HOW MANY TIMES DO YOU TALK ON THE PHONE WITH FAMILY, FRIENDS, OR NEIGHBORS?: TWICE A WEEK

## 2023-10-17 SDOH — SOCIAL STABILITY: SOCIAL INSECURITY: ARE YOU OR HAVE YOU BEEN THREATENED OR ABUSED PHYSICALLY, EMOTIONALLY, OR SEXUALLY BY ANYONE?: NO

## 2023-10-17 SDOH — SOCIAL STABILITY: SOCIAL INSECURITY: DO YOU FEEL ANYONE HAS EXPLOITED OR TAKEN ADVANTAGE OF YOU FINANCIALLY OR OF YOUR PERSONAL PROPERTY?: NO

## 2023-10-17 SDOH — SOCIAL STABILITY: SOCIAL INSECURITY: HAVE YOU HAD THOUGHTS OF HARMING ANYONE ELSE?: NO

## 2023-10-17 SDOH — SOCIAL STABILITY: SOCIAL INSECURITY: WITHIN THE LAST YEAR, HAVE YOU BEEN HUMILIATED OR EMOTIONALLY ABUSED IN OTHER WAYS BY YOUR PARTNER OR EX-PARTNER?: NO

## 2023-10-17 SDOH — SOCIAL STABILITY: SOCIAL INSECURITY: WITHIN THE LAST YEAR, HAVE YOU BEEN AFRAID OF YOUR PARTNER OR EX-PARTNER?: NO

## 2023-10-17 SDOH — ECONOMIC STABILITY: INCOME INSECURITY: IN THE PAST 12 MONTHS, HAS THE ELECTRIC, GAS, OIL, OR WATER COMPANY THREATENED TO SHUT OFF SERVICE IN YOUR HOME?: NO

## 2023-10-17 SDOH — SOCIAL STABILITY: SOCIAL INSECURITY: DO YOU FEEL UNSAFE GOING BACK TO THE PLACE WHERE YOU ARE LIVING?: NO

## 2023-10-17 SDOH — HEALTH STABILITY: PHYSICAL HEALTH: ON AVERAGE, HOW MANY MINUTES DO YOU ENGAGE IN EXERCISE AT THIS LEVEL?: 150+ MIN

## 2023-10-17 SDOH — HEALTH STABILITY: PHYSICAL HEALTH: ON AVERAGE, HOW MANY DAYS PER WEEK DO YOU ENGAGE IN MODERATE TO STRENUOUS EXERCISE (LIKE A BRISK WALK)?: 5 DAYS

## 2023-10-17 SDOH — SOCIAL STABILITY: SOCIAL INSECURITY: DOES ANYONE TRY TO KEEP YOU FROM HAVING/CONTACTING OTHER FRIENDS OR DOING THINGS OUTSIDE YOUR HOME?: NO

## 2023-10-17 SDOH — SOCIAL STABILITY: SOCIAL INSECURITY: HAS ANYONE EVER THREATENED TO HURT YOUR FAMILY OR YOUR PETS?: NO

## 2023-10-17 ASSESSMENT — COGNITIVE AND FUNCTIONAL STATUS - GENERAL
PATIENT BASELINE BEDBOUND: NO
DAILY ACTIVITIY SCORE: 24
STANDING UP FROM CHAIR USING ARMS: A LITTLE
MOVING TO AND FROM BED TO CHAIR: A LITTLE
MOBILITY SCORE: 22

## 2023-10-17 ASSESSMENT — PAIN DESCRIPTION - ORIENTATION: ORIENTATION: RIGHT;MID

## 2023-10-17 ASSESSMENT — LIFESTYLE VARIABLES
AUDIT-C TOTAL SCORE: 0
PRESCIPTION_ABUSE_PAST_12_MONTHS: NO
HOW OFTEN DO YOU HAVE 6 OR MORE DRINKS ON ONE OCCASION: NEVER
SUBSTANCE_ABUSE_PAST_12_MONTHS: NO
HOW MANY STANDARD DRINKS CONTAINING ALCOHOL DO YOU HAVE ON A TYPICAL DAY: PATIENT DOES NOT DRINK
HOW OFTEN DO YOU HAVE A DRINK CONTAINING ALCOHOL: NEVER
HAVE PEOPLE ANNOYED YOU BY CRITICIZING YOUR DRINKING: NO
EVER HAD A DRINK FIRST THING IN THE MORNING TO STEADY YOUR NERVES TO GET RID OF A HANGOVER: NO
REASON UNABLE TO ASSESS: NO
SKIP TO QUESTIONS 9-10: 1
HAVE YOU EVER FELT YOU SHOULD CUT DOWN ON YOUR DRINKING: NO
EVER FELT BAD OR GUILTY ABOUT YOUR DRINKING: NO
AUDIT-C TOTAL SCORE: 0

## 2023-10-17 ASSESSMENT — PAIN DESCRIPTION - ONSET: ONSET: AWAKENED FROM SLEEP

## 2023-10-17 ASSESSMENT — ACTIVITIES OF DAILY LIVING (ADL)
HEARING - RIGHT EAR: FUNCTIONAL
WALKS IN HOME: INDEPENDENT
ADEQUATE_TO_COMPLETE_ADL: YES
PATIENT'S MEMORY ADEQUATE TO SAFELY COMPLETE DAILY ACTIVITIES?: YES
ASSISTIVE_DEVICE: EYEGLASSES
LACK_OF_TRANSPORTATION: NO
TOILETING: INDEPENDENT
DRESSING YOURSELF: INDEPENDENT
FEEDING YOURSELF: INDEPENDENT
JUDGMENT_ADEQUATE_SAFELY_COMPLETE_DAILY_ACTIVITIES: YES
GROOMING: INDEPENDENT
HEARING - LEFT EAR: FUNCTIONAL
BATHING: INDEPENDENT

## 2023-10-17 ASSESSMENT — PAIN DESCRIPTION - PROGRESSION: CLINICAL_PROGRESSION: GRADUALLY WORSENING

## 2023-10-17 ASSESSMENT — PAIN DESCRIPTION - LOCATION: LOCATION: CHEST

## 2023-10-17 ASSESSMENT — COLUMBIA-SUICIDE SEVERITY RATING SCALE - C-SSRS
6. HAVE YOU EVER DONE ANYTHING, STARTED TO DO ANYTHING, OR PREPARED TO DO ANYTHING TO END YOUR LIFE?: NO
2. HAVE YOU ACTUALLY HAD ANY THOUGHTS OF KILLING YOURSELF?: NO
1. IN THE PAST MONTH, HAVE YOU WISHED YOU WERE DEAD OR WISHED YOU COULD GO TO SLEEP AND NOT WAKE UP?: NO

## 2023-10-17 ASSESSMENT — PATIENT HEALTH QUESTIONNAIRE - PHQ9
SUM OF ALL RESPONSES TO PHQ9 QUESTIONS 1 & 2: 0
1. LITTLE INTEREST OR PLEASURE IN DOING THINGS: NOT AT ALL
2. FEELING DOWN, DEPRESSED OR HOPELESS: NOT AT ALL

## 2023-10-17 ASSESSMENT — PAIN DESCRIPTION - DESCRIPTORS
DESCRIPTORS: ACHING;PRESSURE
DESCRIPTORS: ACHING;PRESSURE

## 2023-10-17 ASSESSMENT — PAIN DESCRIPTION - FREQUENCY: FREQUENCY: INTERMITTENT

## 2023-10-17 ASSESSMENT — PAIN - FUNCTIONAL ASSESSMENT
PAIN_FUNCTIONAL_ASSESSMENT: 0-10

## 2023-10-17 ASSESSMENT — PAIN SCALES - GENERAL
PAINLEVEL_OUTOF10: 0 - NO PAIN
PAINLEVEL_OUTOF10: 0 - NO PAIN
PAINLEVEL_OUTOF10: 8

## 2023-10-17 ASSESSMENT — PAIN DESCRIPTION - PAIN TYPE: TYPE: ACUTE PAIN

## 2023-10-17 NOTE — Clinical Note
Angioplasty of the distal right coronary artery lesion. Inflation 1: Pressure = 8 amina; Duration = 120 sec.

## 2023-10-17 NOTE — ED NOTES
Patient arrived from triage and stemi alert was called; patient was placed in room #30 and placed on monitor;  EKG done; Dr at bedside; 2 PIVs placed; labs sent; stemi was eventually stood down; patient states CP is intermittent now after ASA and Morphine; ntg gtt was started at 5 mcg/min     Kat Lam RN  10/17/23 0612

## 2023-10-17 NOTE — CARE PLAN
Problem: Discharge Planning  Goal: Discharge to home or other facility with appropriate resources  Outcome: Not Progressing     Problem: Pain - Adult  Goal: Verbalizes/displays adequate comfort level or baseline comfort level  Outcome: Progressing     Problem: Safety - Adult  Goal: Free from fall injury  Outcome: Progressing     Problem: Chronic Conditions and Co-morbidities  Goal: Patient's chronic conditions and co-morbidity symptoms are monitored and maintained or improved  Outcome: Progressing     Problem: ACS/CP/NSTEMI/STEMI  Goal: Chest pain managed (free from pain or at acceptable level)  Outcome: Progressing  Goal: Lab values return to normal range  Outcome: Progressing  Goal: Promote self management  Outcome: Progressing  Goal: Serial ECG will return to baseline  Outcome: Progressing  Goal: Verbalize understanding of procedures/devices  Outcome: Progressing  Goal: Wean vasopressors/achieve hemodynamic stability  Outcome: Progressing     Problem: Cardiac catheterization  Goal: Free from dysrhythmias  Outcome: Progressing  Goal: Free from pain  Outcome: Progressing  Goal: No evidence of post procedure complications  Outcome: Progressing  Goal: Promote self management  Outcome: Progressing  Goal: Verbalize understanding of procedure  Outcome: Progressing  Goal: Care and maintenance of device (specify)  Outcome: Progressing   The patient's goals for the shift include      The clinical goals for the shift include get catheterization.    Over the shift, the patient did not make progress toward the following goals. Barriers to progression include taking blood thinners this AM. Recommendations to address these barriers include education.    Problem: Discharge Planning  Goal: Discharge to home or other facility with appropriate resources  Outcome: Not Progressing

## 2023-10-17 NOTE — H&P
History Of Present Illness  Vladimir Forrester is a 57 y.o. male with history of CAD, hypertension, hyperlipidemia, congestive heart failure, hypothyroidism, who presents emergency department for assessment of chest pain  Started about an hour and a half prior to ER visit.  Pain does not radiate.  Pain initially started 3 days ago.  Pain has been coming and going.  Pain is located to the anterior chest and feels like a tightness.  He denies shortness of breath or sweats.  Denies nausea or vomiting.  He has had multiple MIs in the past and feels similar.  He reports that he is compliant with his medications and took his meds this morning.      In the ED, troponin 170 and 178. EKG with no evidence of STEMI. Patient given aspirin and admitted to the heart center.    Past Medical History  He has a past medical history of Abnormal findings on diagnostic imaging of other specified body structures, Abnormal findings on diagnostic imaging of other specified body structures, Personal history of other medical treatment, and Personal history of other medical treatment.    Surgical History  He has a past surgical history that includes Carotid stent (05/30/2023) and US guided mediastinum percutaneous biopsy (7/17/2023).     Social History  He reports that he quit smoking about 50 years ago. His smoking use included cigarettes. He has never used smokeless tobacco. He reports current alcohol use of about 2.0 standard drinks of alcohol per week. He reports that he does not use drugs.    Family History  Family History   Problem Relation Name Age of Onset    Bone cancer Father      Brain cancer Brother      Throat cancer Brother      Lung cancer Brother          Allergies  Other    Review of Systems   Otherwise negative    Physical Exam  General: A+Ox3, mild distress  HEENT: MMM, no lesions  Heart: RRR  Lungs: CTAB  Abdomen: soft, NT, ND  Extremities: no edema  Neuro: moves extremities spontaneously x4    Last Recorded Vitals  BP (!) 138/93  (BP Location: Right arm, Patient Position: Sitting)   Pulse 63   Temp 36.3 °C (97.3 °F) (Temporal)   Resp 17   Wt 74.8 kg (165 lb)   SpO2 99%     Relevant Results         Assessment/Plan   Principal Problem:    ST elevation myocardial infarction (STEMI), unspecified artery (CMS/[facility])  Active Problems:    NSTEMI (non-ST elevated myocardial infarction) (CMS/[facility])      NSTEMI  CAD s/p multiple stents  -Admit to heart center  -Cardiology consulted, appreciate recs  -Trend trops  -Echo  -Aspirin, plavix, statin  -Beta blocker  -Hold AC  -Cath tomorrow    Hypokalemia  -Replete  -Check Mg    Hypercalcemia  -IVF  -Repeat labs    HTN  HLD  -Home meds    CKD  -Buster Tovar, DO

## 2023-10-17 NOTE — ED PROVIDER NOTES
Limitations to History: pain  External Records Reviewed  Independent Historians: wife  Social determinants affecting care: none    HPI  Vladimir Forrester is a 57 y.o. male with history of CAD, hypertension, hyperlipidemia, congestive heart failure, hypothyroidism, who presents emergency department for assessment of chest pain  Started about an hour and a half prior to ER visit.  Pain does not radiate.  His wife is at bedside who help provide history.  He is a poor historian due to pain.  She reports that his pain initially started 3 days ago.  Pain has been coming and going.  Pain is located to the anterior chest and feels like a tightness.  He denies shortness of breath or sweats.  Denies nausea or vomiting.  He has had multiple MIs in the past and feels similar.  He reports that he is compliant with his medications and took his meds this morning.  The patient and his wife have no further complaints.    PMH  Past Medical History:   Diagnosis Date    Abnormal findings on diagnostic imaging of other specified body structures     Abnormal CT of the chest    Abnormal findings on diagnostic imaging of other specified body structures     Abnormal chest x-ray    Personal history of other medical treatment     H/O diagnostic ultrasound    Personal history of other medical treatment     History of transesophageal echocardiography (DANTE)    reviewed by myself.    Meds  Current Outpatient Medications   Medication Instructions    acetaminophen (Tylenol) 325 mg tablet oral, Every 6 hours PRN    albuterol 90 mcg/actuation inhaler inhalation, Every 6 hours    amiodarone (Pacerone) 200 mg tablet 1 tablet, oral, 2 times daily    amLODIPine (Norvasc) 5 mg tablet 1 tablet, oral, Daily    apixaban (Eliquis) 5 mg tablet 1 tablet, oral, 2 times daily    aspirin 81 mg EC tablet 1 tablet, oral, Daily    clopidogrel (Plavix) 75 mg tablet 1 tablet, oral, Daily    ergocalciferol (VITAMIN D-2) 1.25 mg, oral, Weekly    Farxiga 10 mg 1 tablet, oral,  "Daily before breakfast    fluticasone (Flonase) 50 mcg/actuation nasal spray 2 sprays, Each Nostril, Daily PRN    furosemide (Lasix) 40 mg tablet 1 tablet, oral, Daily    levothyroxine (SYNTHROID, LEVOXYL) 25 mcg, oral, Daily    metoprolol succinate XL (Toprol-XL) 200 mg 24 hr tablet 1 tablet, oral, Daily    rosuvastatin (CRESTOR) 20 mg, oral, Daily    sacubitriL-valsartan (Entresto) 49-51 mg tablet 1 tablet, oral, 2 times daily    valsartan (Diovan) 80 mg tablet 1 tablet, oral, Daily       Allergies  Allergies   Allergen Reactions    Other Hives and Swelling     Candelario powdered drink    reviewed by myself.    SHx  Social History     Tobacco Use    Smoking status: Former     Types: Cigarettes     Quit date:      Years since quittin.8    Smokeless tobacco: Never   Vaping Use    Vaping Use: Never used   Substance Use Topics    Alcohol use: Yes     Alcohol/week: 2.0 standard drinks of alcohol     Types: 2 Cans of beer per week     Comment: occasionally    Drug use: Never    reviewed by myself.      ------------------------------------------------------------------------------------------------------------------------------------------    /88   Pulse 64   Temp 36.3 °C (97.3 °F) (Skin)   Resp 11   Ht 1.73 m (5' 8.11\")   Wt 81.2 kg (179 lb)   SpO2 100%   BMI 27.13 kg/m²     Physical Exam  Constitutional:       Appearance: Normal appearance. He is diaphoretic.      Comments: Appears uncomfortable   HENT:      Head: Normocephalic.      Nose: Nose normal.      Mouth/Throat:      Mouth: Mucous membranes are moist.      Pharynx: Oropharynx is clear.   Eyes:      Extraocular Movements: Extraocular movements intact.      Conjunctiva/sclera: Conjunctivae normal.   Cardiovascular:      Rate and Rhythm: Normal rate and regular rhythm.      Pulses:           Radial pulses are 2+ on the right side and 2+ on the left side.   Pulmonary:      Effort: Pulmonary effort is normal.      Breath sounds: Normal breath " sounds.   Abdominal:      General: Abdomen is flat. Bowel sounds are normal.      Palpations: Abdomen is soft.      Tenderness: There is no abdominal tenderness.   Musculoskeletal:         General: Normal range of motion.      Cervical back: Neck supple.      Right lower leg: No edema.      Left lower leg: No edema.   Skin:     General: Skin is warm.   Neurological:      General: No focal deficit present.      Mental Status: He is alert and oriented to person, place, and time.   Psychiatric:         Attention and Perception: Attention normal.         Mood and Affect: Mood normal.          ------------------------------------------------------------------------------------------------------------------------------------------    XR chest 1 view   Final Result   Minimal generalized interstitial prominence which could be some   chronic disease or possibly a minimal component of interstitial   edema. No discrete consolidation.        Signed by: Tony Andres 10/17/2023 7:22 AM   Dictation workstation:   [id number]      Cardiac catheterization - coronary    (Results Pending)        Labs Reviewed   CBC WITH AUTO DIFFERENTIAL - Abnormal       Result Value    WBC 5.6      nRBC 0.0      RBC 4.47 (*)     Hemoglobin 13.8      Hematocrit 40.5 (*)     MCV 91      MCH 30.9      MCHC 34.1      RDW 13.4      Platelets 282      MPV 9.0      Neutrophils % 47.5      Immature Granulocytes %, Automated 0.5      Lymphocytes % 23.8      Monocytes % 22.9      Eosinophils % 4.6      Basophils % 0.7      Neutrophils Absolute 2.67      Immature Granulocytes Absolute, Automated 0.03      Lymphocytes Absolute 1.34      Monocytes Absolute 1.29 (*)     Eosinophils Absolute 0.26      Basophils Absolute 0.04     COMPREHENSIVE METABOLIC PANEL - Abnormal    Glucose 114 (*)     Sodium 137      Potassium 2.9 (*)     Chloride 102      Bicarbonate 25      Anion Gap 13      Urea Nitrogen 45 (*)     Creatinine 2.85 (*)     eGFR 25 (*)     Calcium 13.0 (*)      Albumin 4.3      Alkaline Phosphatase 87      Total Protein 8.5 (*)     AST 27      Bilirubin, Total 0.4      ALT 26     PROTIME-INR - Abnormal    Protime 13.6 (*)     INR 1.2 (*)    SERIAL TROPONIN-INITIAL - Abnormal    Troponin I, High Sensitivity 170 (*)     Narrative:     Less than 99th percentile of normal range cutoff-  Female and children under 18 years old <14 ng/L; Male <21 ng/L: Negative  Repeat testing should be performed if clinically indicated.     Female and children under 18 years old 14-50 ng/L; Male 21-50 ng/L:  Consistent with possible cardiac damage and possible increased clinical   risk. Serial measurements may help to assess extent of myocardial damage.     >50 ng/L: Consistent with cardiac damage, increased clinical risk and  myocardial infarction. Serial measurements may help assess extent of   myocardial damage.      NOTE: Children less than 1 year old may have higher baseline troponin   levels and results should be interpreted in conjunction with the overall   clinical context.     NOTE: Troponin I testing is performed using a different   testing methodology at [facility] than at other   [facility]. Direct result comparisons should only   be made within the same method.   CALCIUM, IONIZED - Abnormal    POCT Calcium, Ionized 1.61 (*)    APTT - Normal    aPTT 33      Narrative:     The APTT is no longer used for monitoring Unfractionated Heparin Therapy. For monitoring Heparin Therapy, use the Heparin Assay.   TROPONIN SERIES- (INITIAL, 1 HR)    Narrative:     The following orders were created for panel order Troponin Series, (0, 1 HR).  Procedure                               Abnormality         Status                     ---------                               -----------         ------                     Troponin I, High Sensiti...[[id number]]  Abnormal            Final result               Troponin, High Sensitivi...[[id number]]                      In process                    Please view results for these tests on the individual orders.   SERIAL TROPONIN, 1 HOUR        Diagnoses as of 10/17/23 0818   ST elevation myocardial infarction (STEMI), unspecified artery (CMS/HCC)        Medical Decision Making: He appears uncomfortable secondary to pain.  EKG was obtained in triage showing ST elevations in the inferior leads with ST depression in V1.  STEMI alert was initiated to take the patient to cath.  Cardiology immediately contacted.  Patient did take his morning Eliquis so heparin and Brilinta held.  He was given aspirin orally and IV morphine for pain control.    Case was discussed and evaluated with ED attending, Dr. Reynoso who spoke with cardiology on-call.  He spoke with Dr. Pavon who recommends nitroglycerin drip.  She would like the STEMI canceled after reviewing the EKG.    Labs and chest x-ray ordered.  EKG ordered for every 30 minutes.  He is on continuous cardiac and pulse ox monitor.    Medications given: Oral aspirin, IV morphine    EKG interpreted by myself: Normal sinus rhythm.  Ventricular rate 75 bpm.  ST elevations in lead II, 3, aVF.  ST depressions in V1.    He was reassessed and is currently chest pain-free.  Repeat EKG obtained showing normal sinus rhythm.  Ventricular rate 65 bpm.  The ST elevations in the inferior leads have improved.  He does have some slight elevations in V3 and V4.    Troponin is elevated at 170.  I consulted Dr. Pavon again. She reviewed the second EKG and she will follow as an inpatient.  Admit to the heart center.  I consulted his PCP.  I spoke with Dr. Tovar who covers he will admit to the heart center.    Labs were reviewed.  He hypokalemic at 2.9.  He is ordered 40 mEq of IV potassium.  He is also hypercalcemic at 13.  Ionized calcium added on and is elevated at 1.61.  This will need to be followed as an inpatient.    Diagnosis: STEMI  Plan: admit              Vito Woodward PA-C  10/17/23 0743       Vito Woodward PA-C  10/17/23  0805

## 2023-10-17 NOTE — Clinical Note
Angioplasty of the distal right coronary artery lesion. Inflation 1: Pressure = 24 amina; Duration = 15 sec. Inflation 2: Pressure = 20 amina; Duration = 15 sec. Inflation 3: Pressure = 24 amina; Duration = 10 sec.

## 2023-10-17 NOTE — CONSULTS
Cardiology Consultation- New Consult    Reason for referral: Possible STEMI, chest pain    HPI: Vladimir Forrester is a 57 y.o.  male who presents today for chest pain.  Patient has significant history of coronary artery disease and follows with Dr. Vieyra.  He underwent left heart catheterization and coronary angiogram with me in April 2023 when he presented with worsening shortness of breath and decreased EF to about 50% from normal EF from few years ago.  He was found to have significant lesions in LAD, diagonal and RCA and eventually underwent PCI of the diagonal and LAD with me with subsequent staged PCI of the RCA from mid to distal in outpatient setting in May 2023.  He also is known to have advanced CKD with baseline creatinine around 1.8.  He has also paroxysmal atrial flutter for which he is on Eliquis and amiodarone as well as metoprolol succinate.  He works in a warehouse.  Patient and his wife reports that over the last few days he has been getting worsening shortness of breath as well as some chest pain and discomfort and eventually decided to come to the ED earlier today.  Initial EKG showed borderline ST elevations in inferior leads.  Patient was hypertensive at the time.  This was discussed with Dr. Pavon who was on-call for STEMI's last night and felt that medical management would be better at this point first since the patient just took his Eliquis and EKG changes are borderline.  After initiation of nitro, chest pain resolved and EKG improved with no clear evidence for STEMI.    Patient reports that he has been taking his medications regularly..  He was found to have worsening renal function with creatinine about 2.8 today and hypokalemia requiring IV potassium supplementation.  His initial troponin was elevated at 170.  I evaluated the patient in the ED and he was chest pain-free on low-dose nitroglycerin IV.        Past Medical History:   Past Medical History:   Diagnosis Date    Abnormal  findings on diagnostic imaging of other specified body structures     Abnormal CT of the chest    Abnormal findings on diagnostic imaging of other specified body structures     Abnormal chest x-ray    Personal history of other medical treatment     H/O diagnostic ultrasound    Personal history of other medical treatment     History of transesophageal echocardiography (DANTE)        Surgical History:   Past Surgical History:   Procedure Laterality Date    CAROTID STENT  05/30/2023    4 stents placed    US GUIDED MEDIASTINUM PERCUTANEOUS BIOPSY  7/17/2023    US GUIDED MEDIASTINUM PERCUTANEOUS BIOPSY 7/17/2023 [facility]       Social History:       Family History:   Family History   Problem Relation Name Age of Onset    Bone cancer Father      Brain cancer Brother      Throat cancer Brother      Lung cancer Brother         Allergies:  Other     I reviewed available prior Cardiovascular Testing.      Review of Systems:    12 system point of review is negative except for what described in history of present illness    Objective     Outpatient Medications:    Current Facility-Administered Medications:     amiodarone (Pacerone) tablet 200 mg, 200 mg, oral, BID, Wilfrid Ricardo MD PhD    amLODIPine (Norvasc) tablet 5 mg, 5 mg, oral, Daily, Wilfrid Ricardo MD PhD    aspirin EC tablet 81 mg, 81 mg, oral, Daily, Wilfrid Ricardo MD PhD    clopidogrel (Plavix) tablet 75 mg, 75 mg, oral, Daily, Wilfrid Ricardo MD PhD    levothyroxine (Synthroid, Levoxyl) tablet 25 mcg, 25 mcg, oral, Daily, Wilfrid Ricardo MD PhD    metoprolol succinate XL (Toprol-XL) 24 hr tablet 200 mg, 200 mg, oral, Daily, Wilfrid Ricardo MD PhD    nitroglycerin (Tridil) 50 mg in dextrose 5% 250 mL (0.2 mg/mL) infusion (premix), 5 mcg/min, intravenous, Continuous, Guy Reynoso MD, Last Rate: 3 mL/hr at 10/17/23 0755, 10 mcg/min at 10/17/23 0755    potassium chloride 20 mEq in 100 mL IV premix, 20 mEq, intravenous, q2h, Guy Reynoso,  MD, Last Rate: 50 mL/hr at 10/17/23 0750, 20 mEq at 10/17/23 0750    rosuvastatin (Crestor) tablet 40 mg, 40 mg, oral, Daily, Wilfrid Ricardo MD PhD    Current Outpatient Medications:     acetaminophen (Tylenol) 325 mg tablet, Take by mouth every 6 hours if needed., Disp: , Rfl:     albuterol 90 mcg/actuation inhaler, Inhale every 6 hours., Disp: , Rfl:     amiodarone (Pacerone) 200 mg tablet, Take 1 tablet (200 mg) by mouth 2 times a day., Disp: , Rfl:     amLODIPine (Norvasc) 5 mg tablet, Take 1 tablet (5 mg) by mouth once daily., Disp: 29 tablet, Rfl: 0    apixaban (Eliquis) 5 mg tablet, Take 1 tablet (5 mg) by mouth 2 times a day., Disp: , Rfl:     aspirin 81 mg EC tablet, Take 1 tablet (81 mg) by mouth once daily., Disp: , Rfl:     clopidogrel (Plavix) 75 mg tablet, Take 1 tablet (75 mg) by mouth once daily., Disp: , Rfl:     ergocalciferol (Vitamin D-2) 1.25 MG (84360 UT) capsule, Take 1 capsule (1,250 mcg) by mouth 1 (one) time per week., Disp: , Rfl:     Farxiga 10 mg, Take 1 tablet (10 mg) by mouth once daily in the morning. Take before meals., Disp: , Rfl:     fluticasone (Flonase) 50 mcg/actuation nasal spray, Administer 2 sprays into each nostril once daily as needed (post nasal drip)., Disp: , Rfl:     furosemide (Lasix) 40 mg tablet, Take 1 tablet (40 mg) by mouth once daily., Disp: , Rfl:     levothyroxine (Synthroid, Levoxyl) 25 mcg tablet, Take 1 tablet (25 mcg) by mouth once daily., Disp: 90 tablet, Rfl: 3    metoprolol succinate XL (Toprol-XL) 200 mg 24 hr tablet, Take 1 tablet (200 mg) by mouth once daily., Disp: , Rfl:     rosuvastatin (Crestor) 20 mg tablet, Take 1 tablet (20 mg) by mouth once daily., Disp: , Rfl:     sacubitriL-valsartan (Entresto) 49-51 mg tablet, Take 1 tablet by mouth 2 times a day., Disp: , Rfl:     valsartan (Diovan) 80 mg tablet, Take 1 tablet (80 mg) by mouth once daily., Disp: , Rfl:      Last Recorded Vitals  /88   Pulse 64   Temp 36.3 °C (97.3 °F) (Skin)  "  Resp 11   Ht 1.73 m (5' 8.11\")   Wt 81.2 kg (179 lb)   SpO2 100%   BMI 27.13 kg/m²     Physical Exam:    General: Awake, alert/oriented x3, well developed, no acute distress  Head: Atraumatic/Normocephalic  Eyes: Normal external exam, EOMI, PERRLA  ENT: Oropharynx normal, moist mucous membranes  Cardiovascular: RRR, S1/S2, no murmurs, rubs, or gallops, radial pulses +2, no edema of extremities  Pulmonary: CTAB, no respiratory distress. No wheezes, rales, or ronchi  Abdomen: +BS, soft, non-tender, nondistended, no guarding or rebound  MSK: No joint swelling, normal movements of all extremities. Range of motion- normal.  Extremities: no edema, no cyanosis  Neuro: Alert/oriented x3, no focal motor or sensory deficits  Psychiatric: Judgment intact. Appropriate mood and behavior     Vitals:    10/17/23 0628 10/17/23 0658 10/17/23 0700   BP: (!) 171/103 131/88 128/88   BP Location: Right arm     Patient Position: Lying     Pulse: 74  64   Resp: 20  11   Temp: 36.3 °C (97.3 °F)     TempSrc: Skin     SpO2: 100%  100%   Weight: 81.2 kg (179 lb)     Height: 1.73 m (5' 8.11\")        Lab/Radiology/Diagnostic Review:      Lab Results   Component Value Date    WBC 5.6 10/17/2023    HGB 13.8 10/17/2023    HCT 40.5 (L) 10/17/2023     10/17/2023    CHOL 100 07/01/2023    TRIG 66 07/01/2023    HDL 42.1 07/01/2023    ALT 26 10/17/2023    AST 27 10/17/2023     10/17/2023    K 2.9 (LL) 10/17/2023     10/17/2023    CREATININE 2.85 (H) 10/17/2023    BUN 45 (H) 10/17/2023    CO2 25 10/17/2023    TSH 3.83 09/28/2023    INR 1.2 (H) 10/17/2023    HGBA1C 5.5 02/14/2023     No results found for: \"CKTOTAL\", \"CKMB\", \"CKMBINDEX\", \"TROPONINI\"   Lab Results   Component Value Date    INR 1.2 (H) 10/17/2023    INR 1.3 (H) 07/01/2023    INR 1.0 05/11/2023    PROTIME 13.6 (H) 10/17/2023    PROTIME 14.9 (H) 07/01/2023    PROTIME 11.5 05/11/2023          Assessment and Plan:   Patient Active Problem List   Diagnosis    Atrial " thrombus    Cardiomyopathy, unspecified (CMS/HCC)    Congestive heart failure (CMS/HCC)    Coronary artery disease    Hyperlipidemia, unspecified    Primary hypertension    Pulmonary embolism (CMS/HCC)    Shortness of breath    Atrial flutter (CMS/HCC)    Abdominal hernia    Overweight with body mass index (BMI) of 27 to 27.9 in adult    PND (post-nasal drip)    Subclinical hypothyroidism    Vitamin D deficiency    Community acquired pneumonia of left lower lobe of lung    Pleural effusion, bilateral    Acute on chronic combined systolic and diastolic heart failure (CMS/HCC)    Epistaxis    Right-sided epistaxis    Hypoxia    Multiple lung nodules on CT    Opacity of lung on imaging study    Status post insertion of drug-eluting stent into left anterior descending artery for coronary artery disease    SCC (spinal cord compression) (CMS/HCC)    Chronic atrial fibrillation (CMS/HCC)    Closed displaced fracture of middle phalanx of right ring finger    Lymph nodes enlarged    Obesity, Class I, BMI 30-34.9    Abnormal positron emission tomography (PET) scan    Abnormal kidney function    CKD (chronic kidney disease), stage IV (CMS/HCC)    Hematuria    Proteinuria    NSTEMI (non-ST elevated myocardial infarction) (CMS/HCC)      Chest pain  Non-STEMI  Acute on chronic decompensated combined systolic and diastolic heart failure  Acute on chronic renal insufficiency  Former smoker  History of epistaxis  Paroxysmal atrial flutter      I believe at this point that the patient is completely chest pain-free and EKG changes have improved and considering his recent dose of the Eliquis that was just this morning and risk of bleeding as well as considering his worsening renal function and his initial presentation with very high blood pressure, it is reasonable to wait on doing the heart catheterization at least till tomorrow to decrease the risk of bleeding as well as monitoring the renal function for now.  I agree to continue  with IV nitroglycerin.  We will hold Eliquis, Entresto and ARB for now.  We will continue with aspirin 81, Plavix 75, increased dose of statin to Crestor 40 mg daily.  We will obtain echocardiogram.  I reviewed the patient's previous echocardiogram and considering his worsening renal function, I do have some concerns about possible infiltrative cardiomyopathy such as amyloidosis.  I will further discuss that with Dr. Vieyra to see if it would be reasonable to obtain a cardiac MRI.  We will continue with Toprol-XL home dose.  We will continue with amiodarone.  We will continue to monitor the patient closely in the heart center for now.    I discussed the plan with patient and his wife and they are in agreement.      This critically ill patient continues to be at-risk for clinically significant deterioration / failure due to the above mentioned dysfunctional, unstable organ systems.  I have personally identified and managed all complex critical care issues to prevent aforementioned clinical deterioration.  Critical care time is spent at bedside and/or the immediate area and has included, but is not limited to, the review of diagnostic tests, labs, radiographs, serial assessments of hemodynamics, respiratory status, ventilatory management, and family updates. More than 50% of the time was spent for coordination of care/family education.    Critical care time: 45 minutes        Thank you for the cardiology consult. We will follow with you.     Wilfrid Ricardo MD, PhD, [facility], [facility]  Interventional Cardiology, [facility]  Associate Professor of Medicine, [facility]   Office: [phone]

## 2023-10-18 LAB
ANION GAP SERPL CALC-SCNC: 9 MMOL/L (ref 10–20)
BUN SERPL-MCNC: 39 MG/DL (ref 6–23)
CALCIUM SERPL-MCNC: 11.4 MG/DL (ref 8.6–10.3)
CHLORIDE SERPL-SCNC: 107 MMOL/L (ref 98–107)
CO2 SERPL-SCNC: 28 MMOL/L (ref 21–32)
CREAT SERPL-MCNC: 2.43 MG/DL (ref 0.5–1.3)
GFR SERPL CREATININE-BSD FRML MDRD: 30 ML/MIN/1.73M*2
GLUCOSE SERPL-MCNC: 78 MG/DL (ref 74–99)
HOLD SPECIMEN: NORMAL
POTASSIUM SERPL-SCNC: 3.7 MMOL/L (ref 3.5–5.3)
SODIUM SERPL-SCNC: 140 MMOL/L (ref 136–145)

## 2023-10-18 PROCEDURE — 92973 PRQ TRLUML C MCHN ASP THRMBC: CPT | Mod: RC | Performed by: STUDENT IN AN ORGANIZED HEALTH CARE EDUCATION/TRAINING PROGRAM

## 2023-10-18 PROCEDURE — C1753 CATH, INTRAVAS ULTRASOUND: HCPCS | Performed by: STUDENT IN AN ORGANIZED HEALTH CARE EDUCATION/TRAINING PROGRAM

## 2023-10-18 PROCEDURE — 99152 MOD SED SAME PHYS/QHP 5/>YRS: CPT | Performed by: STUDENT IN AN ORGANIZED HEALTH CARE EDUCATION/TRAINING PROGRAM

## 2023-10-18 PROCEDURE — 2550000001 HC RX 255 CONTRASTS: Performed by: STUDENT IN AN ORGANIZED HEALTH CARE EDUCATION/TRAINING PROGRAM

## 2023-10-18 PROCEDURE — 92978 ENDOLUMINL IVUS OCT C 1ST: CPT | Performed by: STUDENT IN AN ORGANIZED HEALTH CARE EDUCATION/TRAINING PROGRAM

## 2023-10-18 PROCEDURE — 2720000007 HC OR 272 NO HCPCS: Performed by: STUDENT IN AN ORGANIZED HEALTH CARE EDUCATION/TRAINING PROGRAM

## 2023-10-18 PROCEDURE — 92941 PRQ TRLML REVSC TOT OCCL AMI: CPT | Performed by: STUDENT IN AN ORGANIZED HEALTH CARE EDUCATION/TRAINING PROGRAM

## 2023-10-18 PROCEDURE — C2623 CATH, TRANSLUMIN, DRUG-COAT: HCPCS | Performed by: STUDENT IN AN ORGANIZED HEALTH CARE EDUCATION/TRAINING PROGRAM

## 2023-10-18 PROCEDURE — 36415 COLL VENOUS BLD VENIPUNCTURE: CPT | Performed by: NURSE PRACTITIONER

## 2023-10-18 PROCEDURE — C1725 CATH, TRANSLUMIN NON-LASER: HCPCS | Performed by: STUDENT IN AN ORGANIZED HEALTH CARE EDUCATION/TRAINING PROGRAM

## 2023-10-18 PROCEDURE — B246ZZ4 ULTRASONOGRAPHY OF RIGHT AND LEFT HEART, TRANSESOPHAGEAL: ICD-10-PCS | Performed by: STUDENT IN AN ORGANIZED HEALTH CARE EDUCATION/TRAINING PROGRAM

## 2023-10-18 PROCEDURE — 2500000002 HC RX 250 W HCPCS SELF ADMINISTERED DRUGS (ALT 637 FOR MEDICARE OP, ALT 636 FOR OP/ED): Performed by: NURSE PRACTITIONER

## 2023-10-18 PROCEDURE — 99153 MOD SED SAME PHYS/QHP EA: CPT | Performed by: STUDENT IN AN ORGANIZED HEALTH CARE EDUCATION/TRAINING PROGRAM

## 2023-10-18 PROCEDURE — 2500000001 HC RX 250 WO HCPCS SELF ADMINISTERED DRUGS (ALT 637 FOR MEDICARE OP): Performed by: NURSE PRACTITIONER

## 2023-10-18 PROCEDURE — 99232 SBSQ HOSP IP/OBS MODERATE 35: CPT | Performed by: INTERNAL MEDICINE

## 2023-10-18 PROCEDURE — 2500000001 HC RX 250 WO HCPCS SELF ADMINISTERED DRUGS (ALT 637 FOR MEDICARE OP): Performed by: STUDENT IN AN ORGANIZED HEALTH CARE EDUCATION/TRAINING PROGRAM

## 2023-10-18 PROCEDURE — 36415 COLL VENOUS BLD VENIPUNCTURE: CPT | Performed by: STUDENT IN AN ORGANIZED HEALTH CARE EDUCATION/TRAINING PROGRAM

## 2023-10-18 PROCEDURE — 2500000004 HC RX 250 GENERAL PHARMACY W/ HCPCS (ALT 636 FOR OP/ED): Performed by: INTERNAL MEDICINE

## 2023-10-18 PROCEDURE — 80048 BASIC METABOLIC PNL TOTAL CA: CPT | Performed by: STUDENT IN AN ORGANIZED HEALTH CARE EDUCATION/TRAINING PROGRAM

## 2023-10-18 PROCEDURE — 02C03Z7 EXTIRPATION OF MATTER FROM CORONARY ARTERY, ONE ARTERY, ORBITAL ATHERECTOMY TECHNIQUE, PERCUTANEOUS APPROACH: ICD-10-PCS | Performed by: STUDENT IN AN ORGANIZED HEALTH CARE EDUCATION/TRAINING PROGRAM

## 2023-10-18 PROCEDURE — 93458 L HRT ARTERY/VENTRICLE ANGIO: CPT | Performed by: STUDENT IN AN ORGANIZED HEALTH CARE EDUCATION/TRAINING PROGRAM

## 2023-10-18 PROCEDURE — 96373 THER/PROPH/DIAG INJ IA: CPT | Performed by: STUDENT IN AN ORGANIZED HEALTH CARE EDUCATION/TRAINING PROGRAM

## 2023-10-18 PROCEDURE — 4A023N7 MEASUREMENT OF CARDIAC SAMPLING AND PRESSURE, LEFT HEART, PERCUTANEOUS APPROACH: ICD-10-PCS | Performed by: STUDENT IN AN ORGANIZED HEALTH CARE EDUCATION/TRAINING PROGRAM

## 2023-10-18 PROCEDURE — 93005 ELECTROCARDIOGRAM TRACING: CPT

## 2023-10-18 PROCEDURE — 93458 L HRT ARTERY/VENTRICLE ANGIO: CPT | Mod: 59 | Performed by: STUDENT IN AN ORGANIZED HEALTH CARE EDUCATION/TRAINING PROGRAM

## 2023-10-18 PROCEDURE — C1885 CATH, TRANSLUMIN ANGIO LASER: HCPCS | Performed by: STUDENT IN AN ORGANIZED HEALTH CARE EDUCATION/TRAINING PROGRAM

## 2023-10-18 PROCEDURE — C1894 INTRO/SHEATH, NON-LASER: HCPCS | Performed by: STUDENT IN AN ORGANIZED HEALTH CARE EDUCATION/TRAINING PROGRAM

## 2023-10-18 PROCEDURE — B2111ZZ FLUOROSCOPY OF MULTIPLE CORONARY ARTERIES USING LOW OSMOLAR CONTRAST: ICD-10-PCS | Performed by: STUDENT IN AN ORGANIZED HEALTH CARE EDUCATION/TRAINING PROGRAM

## 2023-10-18 PROCEDURE — 2020000001 HC ICU ROOM DAILY

## 2023-10-18 PROCEDURE — 85347 COAGULATION TIME ACTIVATED: CPT

## 2023-10-18 PROCEDURE — C1887 CATHETER, GUIDING: HCPCS | Performed by: STUDENT IN AN ORGANIZED HEALTH CARE EDUCATION/TRAINING PROGRAM

## 2023-10-18 PROCEDURE — 02703ZZ DILATION OF CORONARY ARTERY, ONE ARTERY, PERCUTANEOUS APPROACH: ICD-10-PCS | Performed by: STUDENT IN AN ORGANIZED HEALTH CARE EDUCATION/TRAINING PROGRAM

## 2023-10-18 PROCEDURE — 2500000005 HC RX 250 GENERAL PHARMACY W/O HCPCS: Performed by: STUDENT IN AN ORGANIZED HEALTH CARE EDUCATION/TRAINING PROGRAM

## 2023-10-18 PROCEDURE — 92924 PRQ TRLUML C ATHRC 1 ART&/BR: CPT | Mod: RC | Performed by: STUDENT IN AN ORGANIZED HEALTH CARE EDUCATION/TRAINING PROGRAM

## 2023-10-18 PROCEDURE — C1769 GUIDE WIRE: HCPCS | Performed by: STUDENT IN AN ORGANIZED HEALTH CARE EDUCATION/TRAINING PROGRAM

## 2023-10-18 PROCEDURE — 2500000005 HC RX 250 GENERAL PHARMACY W/O HCPCS: Performed by: EMERGENCY MEDICINE

## 2023-10-18 PROCEDURE — 2500000004 HC RX 250 GENERAL PHARMACY W/ HCPCS (ALT 636 FOR OP/ED): Performed by: STUDENT IN AN ORGANIZED HEALTH CARE EDUCATION/TRAINING PROGRAM

## 2023-10-18 PROCEDURE — 92978 ENDOLUMINL IVUS OCT C 1ST: CPT | Mod: RC | Performed by: STUDENT IN AN ORGANIZED HEALTH CARE EDUCATION/TRAINING PROGRAM

## 2023-10-18 PROCEDURE — 99233 SBSQ HOSP IP/OBS HIGH 50: CPT | Performed by: STUDENT IN AN ORGANIZED HEALTH CARE EDUCATION/TRAINING PROGRAM

## 2023-10-18 PROCEDURE — 2500000004 HC RX 250 GENERAL PHARMACY W/ HCPCS (ALT 636 FOR OP/ED): Performed by: NURSE PRACTITIONER

## 2023-10-18 PROCEDURE — C1757 CATH, THROMBECTOMY/EMBOLECT: HCPCS | Performed by: STUDENT IN AN ORGANIZED HEALTH CARE EDUCATION/TRAINING PROGRAM

## 2023-10-18 RX ORDER — VERAPAMIL HYDROCHLORIDE 2.5 MG/ML
INJECTION, SOLUTION INTRAVENOUS AS NEEDED
Status: DISCONTINUED | OUTPATIENT
Start: 2023-10-18 | End: 2023-10-18 | Stop reason: HOSPADM

## 2023-10-18 RX ORDER — IODIXANOL 320 MG/ML
INJECTION, SOLUTION INTRAVASCULAR AS NEEDED
Status: DISCONTINUED | OUTPATIENT
Start: 2023-10-18 | End: 2023-10-18 | Stop reason: HOSPADM

## 2023-10-18 RX ORDER — FENTANYL CITRATE 50 UG/ML
INJECTION, SOLUTION INTRAMUSCULAR; INTRAVENOUS AS NEEDED
Status: DISCONTINUED | OUTPATIENT
Start: 2023-10-18 | End: 2023-10-18 | Stop reason: HOSPADM

## 2023-10-18 RX ORDER — NITROGLYCERIN 40 MG/100ML
INJECTION INTRAVENOUS CONTINUOUS PRN
Status: COMPLETED | OUTPATIENT
Start: 2023-10-18 | End: 2023-10-18

## 2023-10-18 RX ORDER — SODIUM CHLORIDE 9 MG/ML
1 INJECTION, SOLUTION INTRAVENOUS CONTINUOUS
Status: ACTIVE | OUTPATIENT
Start: 2023-10-18 | End: 2023-10-18

## 2023-10-18 RX ORDER — LIDOCAINE HYDROCHLORIDE 20 MG/ML
INJECTION, SOLUTION INFILTRATION; PERINEURAL AS NEEDED
Status: DISCONTINUED | OUTPATIENT
Start: 2023-10-18 | End: 2023-10-18 | Stop reason: HOSPADM

## 2023-10-18 RX ORDER — SODIUM CHLORIDE 9 MG/ML
1 INJECTION, SOLUTION INTRAVENOUS CONTINUOUS
Status: DISCONTINUED | OUTPATIENT
Start: 2023-10-18 | End: 2023-10-18

## 2023-10-18 RX ORDER — MIDAZOLAM HYDROCHLORIDE 1 MG/ML
INJECTION INTRAMUSCULAR; INTRAVENOUS AS NEEDED
Status: DISCONTINUED | OUTPATIENT
Start: 2023-10-18 | End: 2023-10-18 | Stop reason: HOSPADM

## 2023-10-18 RX ORDER — HEPARIN SODIUM 1000 [USP'U]/ML
INJECTION, SOLUTION INTRAVENOUS; SUBCUTANEOUS AS NEEDED
Status: DISCONTINUED | OUTPATIENT
Start: 2023-10-18 | End: 2023-10-18 | Stop reason: HOSPADM

## 2023-10-18 RX ADMIN — CLOPIDOGREL BISULFATE 75 MG: 75 TABLET, FILM COATED ORAL at 08:00

## 2023-10-18 RX ADMIN — METOPROLOL SUCCINATE 200 MG: 50 TABLET, EXTENDED RELEASE ORAL at 10:39

## 2023-10-18 RX ADMIN — SODIUM CHLORIDE 100 ML/HR: 9 INJECTION, SOLUTION INTRAVENOUS at 09:07

## 2023-10-18 RX ADMIN — AMIODARONE HYDROCHLORIDE 200 MG: 200 TABLET ORAL at 10:39

## 2023-10-18 RX ADMIN — ROSUVASTATIN CALCIUM 40 MG: 10 TABLET, FILM COATED ORAL at 10:39

## 2023-10-18 RX ADMIN — SODIUM CHLORIDE 1 ML/KG/HR: 9 INJECTION, SOLUTION INTRAVENOUS at 10:38

## 2023-10-18 RX ADMIN — LEVOTHYROXINE SODIUM 25 MCG: 25 TABLET ORAL at 10:40

## 2023-10-18 RX ADMIN — ASPIRIN 81 MG: 81 TABLET, COATED ORAL at 08:00

## 2023-10-18 RX ADMIN — NITROGLYCERIN 5 MCG/MIN: 20 INJECTION INTRAVENOUS at 04:08

## 2023-10-18 RX ADMIN — AMIODARONE HYDROCHLORIDE 200 MG: 200 TABLET ORAL at 20:05

## 2023-10-18 RX ADMIN — AMLODIPINE BESYLATE 5 MG: 5 TABLET ORAL at 10:39

## 2023-10-18 ASSESSMENT — PAIN SCALES - GENERAL
PAINLEVEL_OUTOF10: 0 - NO PAIN

## 2023-10-18 ASSESSMENT — PAIN - FUNCTIONAL ASSESSMENT
PAIN_FUNCTIONAL_ASSESSMENT: 0-10

## 2023-10-18 NOTE — NURSING NOTE
Vasc Band Removal Note    Vasc Band in place from cath lab with a total of 12 mls of air.     3mls of air removed every 15min with site checks from 1145 to 1245 until deflated. Removed at 1245.   No complications noted.    2x2 and tegaderm placed over site. Patient educated on site precautions.

## 2023-10-18 NOTE — PROGRESS NOTES
Subjective   No chest pain or shortness of breath.  Underwent left heart catheterization and coronary angiogram and PCI with me today.    Review of Systems:  Otherwise, limited cardiovascular review of systems is negative.    Medical History:   Past Medical History:   Diagnosis Date    Abnormal findings on diagnostic imaging of other specified body structures     Abnormal CT of the chest    Abnormal findings on diagnostic imaging of other specified body structures     Abnormal chest x-ray    Personal history of other medical treatment     H/O diagnostic ultrasound    Personal history of other medical treatment     History of transesophageal echocardiography (DANTE)     Surgical History:   Past Surgical History:   Procedure Laterality Date    CAROTID STENT  05/30/2023    4 stents placed    US GUIDED MEDIASTINUM PERCUTANEOUS BIOPSY  7/17/2023    US GUIDED MEDIASTINUM PERCUTANEOUS BIOPSY 7/17/2023 PAR US      Social History:   Social Determinants of Health with Concerns     Tobacco Use: Medium Risk (10/17/2023)    Patient History     Smoking Tobacco Use: Former     Smokeless Tobacco Use: Never     Passive Exposure: Not on file   Social Connections: Unknown (10/17/2023)    Social Connection and Isolation Panel [NHANES]     Frequency of Communication with Friends and Family: Twice a week     Frequency of Social Gatherings with Friends and Family: Once a week     Attends Zoroastrian Services: Patient refused     Active Member of Clubs or Organizations: No     Attends Club or Organization Meetings: Never     Marital Status:    Digital Equity: Not on file     Family History:   Family History   Problem Relation Name Age of Onset    Bone cancer Father      Brain cancer Brother      Throat cancer Brother      Lung cancer Brother            Objective   Physical Exam  Vitals:    10/18/23 1515 10/18/23 1530 10/18/23 1545 10/18/23 1600   BP:    128/80   BP Location:       Patient Position:       Pulse: 69 67 61 68   Resp: 19 25  "16 22   Temp:       TempSrc:       SpO2: 99% 99% 97% 97%   Weight:       Height:         Wt Readings from Last 3 Encounters:   10/18/23 77.5 kg (170 lb 12.8 oz)   09/28/23 80.2 kg (176 lb 12.8 oz)   06/28/23 79.5 kg (175 lb 4 oz)     Physical Exam  Constitutional:       Appearance: Normal appearance.   HENT:      Head: Normocephalic and atraumatic.      Nose: Nose normal.      Mouth/Throat:      Mouth: Mucous membranes are moist.   Eyes:      Extraocular Movements: Extraocular movements intact.      Pupils: Pupils are equal, round, and reactive to light.   Cardiovascular:      Rate and Rhythm: Normal rate and regular rhythm.      Heart sounds: No murmur heard.  Pulmonary:      Effort: Pulmonary effort is normal.      Breath sounds: Normal breath sounds.   Abdominal:      General: Abdomen is flat.      Palpations: Abdomen is soft.   Musculoskeletal:         General: Normal range of motion.      Cervical back: Normal range of motion and neck supple.      Right lower leg: No edema.      Left lower leg: No edema.   Skin:     General: Skin is warm.   Neurological:      General: No focal deficit present.      Mental Status: alert and oriented to person, place, and time.   Psychiatric:         Mood and Affect: Mood normal.         Behavior: Behavior normal.     Lab Results   Component Value Date    WBC 5.6 10/17/2023    HGB 13.8 10/17/2023    HCT 40.5 (L) 10/17/2023     10/17/2023    CHOL 100 07/01/2023    TRIG 66 07/01/2023    HDL 42.1 07/01/2023    ALT 23 10/17/2023    AST 24 10/17/2023     10/18/2023    K 3.7 10/18/2023     10/18/2023    CREATININE 2.43 (H) 10/18/2023    BUN 39 (H) 10/18/2023    CO2 28 10/18/2023    TSH 3.83 09/28/2023    INR 1.2 (H) 10/17/2023    HGBA1C 5.5 02/14/2023     No results found for: \"CKTOTAL\", \"CKMB\", \"CKMBINDEX\", \"TROPONINI\"     Lab Results   Component Value Date    INR 1.2 (H) 10/17/2023    INR 1.3 (H) 07/01/2023    INR 1.0 05/11/2023    PROTIME 13.6 (H) 10/17/2023    " PROTIME 14.9 (H) 07/01/2023    PROTIME 11.5 05/11/2023         Current Facility-Administered Medications   Medication Dose Route Frequency Provider Last Rate Last Admin    amiodarone (Pacerone) tablet 200 mg  200 mg oral BID TAMANNA Lam   200 mg at 10/18/23 1039    amLODIPine (Norvasc) tablet 5 mg  5 mg oral Daily TAMANNA Lam   5 mg at 10/18/23 1039    [START ON 10/19/2023] apixaban (Eliquis) tablet 5 mg  5 mg oral q12h TAMANNA Lam        aspirin EC tablet 81 mg  81 mg oral Daily TAMANNA Lam   81 mg at 10/18/23 0800    clopidogrel (Plavix) tablet 75 mg  75 mg oral Daily TAMANNA Lam   75 mg at 10/18/23 0800    levothyroxine (Synthroid, Levoxyl) tablet 25 mcg  25 mcg oral Daily TAMANNA Lam   25 mcg at 10/18/23 1040    metoprolol succinate XL (Toprol-XL) 24 hr tablet 200 mg  200 mg oral Daily TAMANNA Lam   200 mg at 10/18/23 1039    nitroglycerin (Tridil) 50 mg in dextrose 5% 250 mL (0.2 mg/mL) infusion (premix)  5 mcg/min intravenous Continuous TAMANNA Lam   Stopped at 10/18/23 0745    oxygen (O2) therapy   inhalation Continuous PRN - O2/gases TAMANNA Lam        perflutren lipid microspheres (Definity) injection 0.5-10 mL of dilution  0.5-10 mL of dilution intravenous Once in imaging TAMANNA Lam        rosuvastatin (Crestor) tablet 40 mg  40 mg oral Daily TAMANNA Lam   40 mg at 10/18/23 1039    sodium chloride 0.9% infusion  1 mL/kg/hr intravenous Continuous TAMANNA Lam 74.8 mL/hr at 10/18/23 1038 1 mL/kg/hr at 10/18/23 1038             Assessment and Plan:    Chest pain  Non-STEMI  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  I agree to continue with IV nitroglycerin.  We will hold Eliquis, Entresto and ARB for now.  We will continue with aspirin 81, Plavix 75, increased dose of statin to Crestor 40 mg daily.  We will obtain echocardiogram.  I reviewed the patient's previous echocardiogram and considering his worsening renal function, I do have some concerns about possible infiltrative cardiomyopathy such as amyloidosis.  I will further discuss that with Dr. Vieyra to see if it would be reasonable to obtain a cardiac MRI.  We will continue with Toprol-XL home dose.  We will continue with amiodarone.  We will continue to monitor the patient closely in the heart center for now.     I discussed the plan with patient and his wife and they are in agreement.        This critically ill patient continues to be at-risk for clinically significant deterioration / failure due to the above mentioned dysfunctional, unstable organ systems.  I have personally identified and managed all complex critical care issues to prevent aforementioned clinical deterioration.  Critical care time is spent at bedside and/or the immediate area and has included, but is not limited to, the review of diagnostic tests, labs, radiographs, serial assessments of hemodynamics, respiratory status, ventilatory management, and family updates. More than 50% of the time was spent for coordination of care/family education.     Critical care time: 45 minutes          Thank you for the cardiology consult. We will follow with you.          October 18,  2023    Patient was taken to the Cath Lab today and underwent left heart catheterization and coronary angiogram.  He was found to have patent stents in LAD and diagonal branch and a 99% stenosis in the distal RCA which was related to layers of the previously placed stents.  He underwent complex PCI including laser atherectomy and thrombectomy and the use of DCB.  Eventually excellent angiographic result was achieved.  We did not deploy and he was stent which will be the third layer of the stents in this area with very high risk of restenosis.  We will continue with medical management for his coronary artery disease that will include 1 month of triple therapy with aspirin, plavix and Eliquis followed by Plavix and Eliquis long-term.  We will continue with statin and other medications from cardiac standpoint for now.    Monitoring in heart [place] for tonight.      Wilfrid Ricardo MD, PhD, [facility], [facility]  Interventional Cardiology, [facility] [place]  Associate Professor of Medicine, [facility]  [email or web address]   Office: [phone]       SIGNATURE: Wilfrid Ricardo MD PhD PATIENT NAME: Vladimir Forrester   DATE/TIME: October 18, 2023 5:05 PM MRN: [id number]

## 2023-10-18 NOTE — CARE PLAN
The patient's goals for the shift include      The clinical goals for the shift include pt will have no chest pain throughout shift and vital will remain stable throughout shift.

## 2023-10-18 NOTE — CARE PLAN
Problem: Discharge Planning  Goal: Discharge to home or other facility with appropriate resources  Outcome: Progressing     Problem: ACS/CP/NSTEMI/STEMI  Goal: Chest pain managed (free from pain or at acceptable level)  Outcome: Progressing  Goal: Lab values return to normal range  Outcome: Progressing  Goal: Promote self management  Outcome: Progressing  Goal: Serial ECG will return to baseline  Outcome: Progressing  Goal: Verbalize understanding of procedures/devices  Outcome: Progressing  Goal: Wean vasopressors/achieve hemodynamic stability  Outcome: Progressing     Problem: Cardiac catheterization  Goal: Free from dysrhythmias  Outcome: Progressing  Goal: Free from pain  Outcome: Progressing  Goal: No evidence of post procedure complications  Outcome: Progressing  Goal: Promote self management  Outcome: Progressing  Goal: Verbalize understanding of procedure  Outcome: Progressing  Goal: Care and maintenance of device (specify)  Outcome: Progressing   The patient's goals for the shift include      The clinical goals for the shift include pt will be free of chest pain

## 2023-10-18 NOTE — POST-PROCEDURE NOTE
Physician Transition of Care Summary  Invasive Cardiovascular Lab    Procedure Date: 10/18/2023  Attending:    Peyton Ricardo - Primary  Resident/Fellow/Other Assistant: C. Barton Gillombardo, MD    Pre Procedure Indications:   ACS less than or equal to 24 hours NSTEMI     Post Procedure Diagnosis:   NSTEMI, distal RCA culprit, 90% ISR    Procedure(s):   Left Heart Catheterization and Percutaneous Coronary Intervention    Procedure Findings:   See syngo    Description of the Procedure:   Laser atherectomy  IVUS guided balloon angioplasty with 4.0 x 40mm DCB    Complications:   None    Stents/Implants:   none    Anticoagulation/Antiplatelet Plan:   ASA / PLAVIX / DOAC    Estimated Blood Loss:   10 mL    Anesthesia: Moderate Sedation Anesthesia Staff: No anesthesia staff entered.    Any Specimen(s) Removed:   No specimens collected during this procedure.    Disposition:   Return to CICU for close monitoring      Electronically signed by: Carl B Gillombardo, MD, 10/18/2023 9:34 AM    C. Barton Gillombardo, MD  Interventional Cardiology Fellow, PGY8

## 2023-10-18 NOTE — PROGRESS NOTES
Vladimir Forrester is a 58 y.o. male on day 1 of admission presenting with ST elevation myocardial infarction (STEMI), unspecified artery (CMS/HCC).      Subjective   S/p cardiac cath with stent placement. Patient seen and examined at bedside with wife present. He is feeling much better and chest pain free. He is [birth year] his [age] today.       Objective     Last Recorded Vitals  BP (!) 141/96   Pulse 77   Temp 36.8 °C (98.2 °F) (Temporal)   Resp 20   Wt 74.8 kg (165 lb)   SpO2 100%   Intake/Output last 3 Shifts:    Intake/Output Summary (Last 24 hours) at 10/18/2023 1422  Last data filed at 10/18/2023 0931  Gross per 24 hour   Intake 2341.8 ml   Output 2500 ml   Net -158.2 ml       Admission Weight  Weight: 81.2 kg (179 lb) (10/17/23 0628)    Daily Weight  10/17/23 : 74.8 kg (165 lb)    Image Results  MR cardiac morphology and function w and wo IV contrast  Narrative: Interpreted By:  Cody Vieyra,   STUDY:  MR CARDIAC MORPHOLOGY AND FUNCTION W AND WO IV CONTRAST;  10/17/2023  4:05 pm      INDICATION:  Signs/Symptoms:Cardiomyopathy, CAD, scar?.   This study is performed  to assess myocardial viability and damage, and to quantitate left  ventricular and valvular function.      COMPARISON:  None.      ACCESSION NUMBER(S):  [id number]      ORDERING CLINICIAN:  CORINNA KU      TECHNIQUE:  Siemens1.5  Chelsi MRI scanner.  Turbo spin echo and balanced steady state free precession (bSSFP)  imaging for anatomic definition. Dynamic cine bSSFP for cardiac  chamber and wall-motion analysis, and valvular analysis. Flow  quantification sequences for hemodynamics. Delayed gadolinium  enhancement analysis after injection of gadolinium-chelate (mL of  Dotarem, 0.2 mmol/kg).      HT- ; WT-; BSA-  m2      FINDINGS:  CARDIAC CHAMBERS  Normal atrioventricular and ventriculoarterial concordance      LEFT ATRIUM  Dilated size.      RIGHT ATRIUM  Normal size      INTERATRIAL SEPTUM  Intact.      LEFT VENTRICLE  There is  "moderate LV hypertrophy. There is asymmetric LVH. LV cavity  size is normal. LV systolic function is normal. There is no LV  mass/thrombus. Quantitative LVEF 52 %.      *Dorothy AM et al. Normalized left ventricular systolic and diastolic  function by steady state free precession cardiovascular magnetic  resonance. J Cardiovasc Magn Reson 2006; 8:417-26.      Delayed-enhancement imaging reveals uniformly \"nulled\" myocardium,  signifying that there has been no prior ischemic myocardial damage.  There is also no definite evidence of interstitial fibrosis to  suggest an infiltrative process.      RIGHT VENTRICLE  RV wall thickness is normal. RV cavity size is normal. RV systolic  function is normal. There is no RV mass/thrombus. There is no RV  fibro-fatty infiltration. Quantitative RVEF 52 %.      INTERVENTRICULAR SEPTUM  Intact.      AORTIC VALVE  Aortic valve is trileaflet. Peak aortic valve velocity 119 cm/sec.  Aortic regurgitant volume 10 ml. Aortic regurgitant fraction 15 %.      MITRAL VALVE  Mitral valve leaflets are normal. Mitral regurgitant volume 0 ml.  Peak mitral valve velocity 59 cm/sec. Mitral regurgitant fraction 0 %.      TRICUSPID VALVE  Tricuspid valve leaflets are normal. Peak tricuspid valve velocity 38  cm/sec. Tricuspid regurgitant fraction 0 %. Tricuspid regurgitant  volume 0 ml.      THORACIC AORTA  The thoracic aorta appears normal in course, caliber, and contour.  There is no evidence for acute aortic pathology. The arch vessel  branching pattern is  normal.   All the arch branch vessels appear  widely patent in their proximal portions.      PULMONARY ARTERIES  The central pulmonary arteries appear  normal (MPA-2.3 cm, RPA-2.3  cm, LPA-2.5 cm).      SYSTEMIC AND PULMONARY VEINS  Normal systemic venous and pulmonary venous return.  The SVC and IVC are of normal caliber.  Normal pulmonary venous anatomy.      CHEST  The chest wall is normal.  Prominent diffuse mediastinal " lymphadenopathy.  Limited imaging through the lungs reveals no gross abnormalities. No  pleural effusion.      UPPER ABDOMEN  Limited imaging through the upper abdomen reveals no abnormalities of  the visualized organs.      Impression:     1. No evidence of scar/LGE in a pattern that would suggest previous  infarct or infiltrative process.  2. Nonspecific mid wall late gadolinium enhancement at the level of  the basal inferolateral wall.  3. Normal biventricular size and function. LVEF 52%. RVEF 52%.  4. There is relative asymmetrical LVH at the level of the basal mid  septum with a maximal wall thickness of up to 1.7 cm. No evidence of  ad. Findings likely represent longstanding hypertension versus  subtle variant hypertrophic cardiomyopathy.  5. Prominent diffuse mediastinal lymphadenopathy appreciated.  Correlate with prior CT PET and biopsy findings with suspicion for  sarcoidosis.              MACRO:  None      Signed by: Cody Vieyra 10/17/2023 8:36 PM  Dictation workstation:   [id number]  Transthoracic Echo (TTE) Limited      [place] [address]Tel [phone] and                                  Fax [phone]    TRANSTHORACIC ECHOCARDIOGRAM REPORT       Patient Name:      AMANDA MCDONOUGH           Reading Physician:    [id number] Corinna Ricardo MD  Study Date:        10/17/2023           Ordering Provider:    [id number] CORINNA RICARDO  MRN/PID:           [id number]             Fellow:  Accession#:        [id number]         Nurse:  Date of Birth/Age: 1965 / 57      Sonographer:          Vianney carpio RDCS  Gender:            M                    Additional Staff:  Height:            172.72 cm            Admit Date:           10/17/2023  Weight:            81.19 kg              Admission Status:     Inpatient -                                                                Routine  BSA:               1.95 m2              Encounter#:           [id number]                                          Department Location:  [address]                                                                [facility]  Blood Pressure: 131 /87 mmHg    Study Type:    TRANSTHORACIC ECHO (TTE) LIMITED  Diagnosis/ICD: Non ST elevation (NSTEMI) myocardial infarction-I21.4  Indication:    CP  CPT Code:      Echo Limited-08660; Doppler Limited-81086; Color Doppler-09106    Patient History:  Pertinent History: Cardiomyopathy, CAD, PE, CHF, A-Fib, HTN and Hyperlipidemia.                     Hx PCI-Stents x4.    Study Detail: The following Echo studies were performed: 2D, Doppler and color                flow.       PHYSICIAN INTERPRETATION:  Left Ventricle: The left ventricular systolic function is low normal, with an estimated ejection fraction of 50-55%. There are no regional wall motion abnormalities. The left ventricular cavity size is normal. There is severe concentric left ventricular hypertrophy. Spectral Doppler shows an impaired relaxation pattern of left ventricular diastolic filling.  Left Atrium: The left atrium is enlarged.  Right Ventricle: The right ventricle is normal in size. There is normal right ventricular global systolic function.  Right Atrium: The right atrium is mildly dilated.  Aortic Valve: The aortic valve is trileaflet. There is trivial aortic valve regurgitation. The peak instantaneous gradient of the aortic valve is 5.8 mmHg.  Mitral Valve: The mitral valve is mildly thickened. There is trace to mild mitral valve regurgitation.  Tricuspid Valve: The tricuspid valve is structurally normal. There is trace tricuspid regurgitation. The Doppler estimated RVSP is within normal limits at 12.1 mmHg.  Pulmonic Valve: The pulmonic valve is structurally normal. There is trace pulmonic  valve regurgitation.  Pericardium: There is no pericardial effusion noted.  Aorta: The aortic root is normal.       CONCLUSIONS:   1. Left ventricular systolic function is low normal with a 50-55% estimated ejection fraction.   2. Spectral Doppler shows an impaired relaxation pattern of left ventricular diastolic filling.   3. There is severe concentric left ventricular hypertrophy.   4. The left atrium is enlarged.   5. RVSP within normal limits.    QUANTITATIVE DATA SUMMARY:  LV SYSTOLIC FUNCTION BY 2D PLANIMETRY (MOD):                      Normal Ranges:  EF-A4C View: 52.1 % (>=55%)  EF-A2C View: 57.0 %  EF-Biplane:  54.2 %    LV DIASTOLIC FUNCTION:                         Normal Ranges:  MV Peak A:    0.48 m/s (0.42-0.7 m/s)  MV lateral e' 0.07 m/s  MV medial e'  0.05 m/s    MITRAL VALVE:                  Normal Ranges:  MV DT: 269 msec (150-240msec)    AORTIC VALVE:                         Normal Ranges:  AoV Vmax:     1.20 m/s (<=1.7m/s)  AoV Peak P.8 mmHg (<20mmHg)  LVOT Max Kp: 0.90 m/s (<=1.1m/s)  LVOT VTI:     15.50 cm       RIGHT VENTRICLE:  TAPSE: 25.5 mm  RV s'  0.13 m/s    TRICUSPID VALVE/RVSP:                              Normal Ranges:  Peak TR Velocity: 1.51 m/s  RV Syst Pressure: 12.1 mmHg (< 30mmHg)       [id number] Wilfrid Ricardo MD  Electronically signed on 10/17/2023 at 11:12:05 AM       ** Final **  XR chest 1 view  Narrative: Interpreted By:  Tony Andres,   STUDY:  XR CHEST 1 VIEW      INDICATION:  Signs/Symptoms:stemi.      COMPARISON:  2023      ACCESSION NUMBER(S):  [id number]      ORDERING CLINICIAN:  MIRNA DOMINGO      FINDINGS:  Minimal prominence of the interstitium diffusely.      No consolidation. No large effusion. No pneumothorax. Cardiomegaly  unchanged.      Impression: Minimal generalized interstitial prominence which could be some  chronic disease or possibly a minimal component of interstitial  edema. No discrete consolidation.      Signed by: Tony Andres  10/17/2023 7:22 AM  Dictation workstation:   [id number]      Physical Exam  General: A+Ox3, mild distress  HEENT: MMM, no lesions  Heart: RRR  Lungs: CTAB  Abdomen: soft, NT, ND  Extremities: no edema  Neuro: moves extremities spontaneously x4     Relevant Results               Assessment/Plan   This patient currently has cardiac telemetry ordered; if you would like to modify or discontinue the telemetry order, click here to go to the orders activity to modify/discontinue the order.              Principal Problem:    ST elevation myocardial infarction (STEMI), unspecified artery (CMS/[facility])  Active Problems:    NSTEMI (non-ST elevated myocardial infarction) (CMS/[facility])      Vladimir Forrester is a 57 y.o. male with history of CAD, hypertension, hyperlipidemia, congestive heart failure, hypothyroidism, who presents emergency department for assessment of chest pain  Started about an hour and a half prior to ER visit.  Pain does not radiate.  Pain initially started 3 days ago.  Pain has been coming and going.  Pain is located to the anterior chest and feels like a tightness.  He denies shortness of breath or sweats.  Denies nausea or vomiting.  He has had multiple MIs in the past and feels similar.  He reports that he is compliant with his medications and took his meds this morning.  In the ED, troponin 170 and 178. EKG with no evidence of STEMI. Patient given aspirin and admitted to the heart center.     NSTEMI  CAD s/p multiple stents  -Admit to heart center  -Cardiology consulted, appreciate recs -- stent placed 10/18  -Trend trops  -Echo ok  -Aspirin, plavix, statin  -Beta blocker  -NOAC     Hypokalemia  -Replete  -Check Mg     Hypercalcemia  -IVF  -Repeat labs     HTN  HLD  -Home meds     CKD  -Mointor    Dispo: possible discharge tomorrow pending cardiac clearance        Tevin Tovar DO

## 2023-10-18 NOTE — H&P
History and Physical   Pre Surgical Review (< 30 days)      History & Physical Reviewed    I have reviewed the History and Physical dated:  10/17/23   History and Physical reviewed and relevant findings noted. Patient examined to review pertinent physical  findings.: No significant changes   Home Medications Reviewed: See EMR.   Allergies Reviewed: no changes noted      Allergies  Allergies   Allergen Reactions    Other Hives and Swelling     Candelario powdered drink       Physical Exam  Physical Exam  Constitutional:       General: He is not in acute distress.  Cardiovascular:      Rate and Rhythm: Normal rate and regular rhythm.      Comments: + pulses all extremities.  Pulmonary:      Effort: Pulmonary effort is normal. No respiratory distress.      Comments: Clear bilaterally.  Abdominal:      General: Bowel sounds are normal.   Skin:     General: Skin is warm and dry.   Neurological:      General: No focal deficit present.      Mental Status: He is alert and oriented to person, place, and time.          Airway/Sedation Assessment    ·  Mouth Opening OK yes      Neck Flexibility OK yes      Loose Teeth No   ·  Oropharyngeal Classification Grade III   ·  ASA PS Classification ASA III - Patient with severe systemic disease       Sedation Plan Moderate     Risks, benefits, and alternatives discussed with patient.     ERAS (Enhanced Recovery After Surgery):  ·  ERAS Patient: No      Consent:   COVID-19 Consent:  ·  COVID-19 Risk Consent Surgeon has reviewed key risks related to the risk of jackie COVID-19 and if they contract COVID-19 what the risks are.     Alexus Henry, APRN-CNP

## 2023-10-19 VITALS
RESPIRATION RATE: 34 BRPM | OXYGEN SATURATION: 99 % | TEMPERATURE: 97.9 F | HEIGHT: 68 IN | SYSTOLIC BLOOD PRESSURE: 144 MMHG | DIASTOLIC BLOOD PRESSURE: 82 MMHG | HEART RATE: 75 BPM | BODY MASS INDEX: 25.46 KG/M2 | WEIGHT: 167.99 LBS

## 2023-10-19 LAB
ANION GAP SERPL CALC-SCNC: 10 MMOL/L (ref 10–20)
BUN SERPL-MCNC: 35 MG/DL (ref 6–23)
CALCIUM SERPL-MCNC: 10.8 MG/DL (ref 8.6–10.3)
CHLORIDE SERPL-SCNC: 105 MMOL/L (ref 98–107)
CO2 SERPL-SCNC: 25 MMOL/L (ref 21–32)
CREAT SERPL-MCNC: 2.28 MG/DL (ref 0.5–1.3)
GFR SERPL CREATININE-BSD FRML MDRD: 32 ML/MIN/1.73M*2
GLUCOSE SERPL-MCNC: 93 MG/DL (ref 74–99)
HOLD SPECIMEN: NORMAL
MAGNESIUM SERPL-MCNC: 1.99 MG/DL (ref 1.6–2.4)
POTASSIUM SERPL-SCNC: 3.4 MMOL/L (ref 3.5–5.3)
SODIUM SERPL-SCNC: 137 MMOL/L (ref 136–145)

## 2023-10-19 PROCEDURE — 2500000001 HC RX 250 WO HCPCS SELF ADMINISTERED DRUGS (ALT 637 FOR MEDICARE OP): Performed by: NURSE PRACTITIONER

## 2023-10-19 PROCEDURE — 99233 SBSQ HOSP IP/OBS HIGH 50: CPT | Performed by: STUDENT IN AN ORGANIZED HEALTH CARE EDUCATION/TRAINING PROGRAM

## 2023-10-19 PROCEDURE — 2500000004 HC RX 250 GENERAL PHARMACY W/ HCPCS (ALT 636 FOR OP/ED): Performed by: NURSE PRACTITIONER

## 2023-10-19 PROCEDURE — 83735 ASSAY OF MAGNESIUM: CPT | Performed by: INTERNAL MEDICINE

## 2023-10-19 PROCEDURE — 93010 ELECTROCARDIOGRAM REPORT: CPT | Performed by: INTERNAL MEDICINE

## 2023-10-19 PROCEDURE — 36415 COLL VENOUS BLD VENIPUNCTURE: CPT | Performed by: NURSE PRACTITIONER

## 2023-10-19 PROCEDURE — 80048 BASIC METABOLIC PNL TOTAL CA: CPT | Performed by: NURSE PRACTITIONER

## 2023-10-19 PROCEDURE — 2500000002 HC RX 250 W HCPCS SELF ADMINISTERED DRUGS (ALT 637 FOR MEDICARE OP, ALT 636 FOR OP/ED): Performed by: NURSE PRACTITIONER

## 2023-10-19 PROCEDURE — 99238 HOSP IP/OBS DSCHRG MGMT 30/<: CPT | Performed by: INTERNAL MEDICINE

## 2023-10-19 RX ORDER — ROSUVASTATIN CALCIUM 40 MG/1
40 TABLET, COATED ORAL DAILY
Qty: 90 TABLET | Refills: 3 | Status: SHIPPED | OUTPATIENT
Start: 2023-10-20 | End: 2024-02-23 | Stop reason: SDUPTHER

## 2023-10-19 RX ORDER — ASPIRIN 81 MG/1
81 TABLET ORAL DAILY
Qty: 30 TABLET | Refills: 0 | Status: SHIPPED | OUTPATIENT
Start: 2023-10-19 | End: 2023-11-18

## 2023-10-19 RX ORDER — POTASSIUM CHLORIDE 14.9 MG/ML
20 INJECTION INTRAVENOUS ONCE
Status: DISCONTINUED | OUTPATIENT
Start: 2023-10-19 | End: 2023-10-19 | Stop reason: HOSPADM

## 2023-10-19 RX ADMIN — AMIODARONE HYDROCHLORIDE 200 MG: 200 TABLET ORAL at 08:51

## 2023-10-19 RX ADMIN — CLOPIDOGREL BISULFATE 75 MG: 75 TABLET, FILM COATED ORAL at 08:51

## 2023-10-19 RX ADMIN — LEVOTHYROXINE SODIUM 25 MCG: 25 TABLET ORAL at 08:51

## 2023-10-19 RX ADMIN — ASPIRIN 81 MG: 81 TABLET, COATED ORAL at 08:51

## 2023-10-19 RX ADMIN — APIXABAN 5 MG: 5 TABLET, FILM COATED ORAL at 08:51

## 2023-10-19 RX ADMIN — ROSUVASTATIN CALCIUM 40 MG: 10 TABLET, FILM COATED ORAL at 08:51

## 2023-10-19 RX ADMIN — AMLODIPINE BESYLATE 5 MG: 5 TABLET ORAL at 08:51

## 2023-10-19 RX ADMIN — METOPROLOL SUCCINATE 200 MG: 50 TABLET, EXTENDED RELEASE ORAL at 08:51

## 2023-10-19 ASSESSMENT — PAIN SCALES - GENERAL
PAINLEVEL_OUTOF10: 0 - NO PAIN
PAINLEVEL_OUTOF10: 0 - NO PAIN

## 2023-10-19 ASSESSMENT — PAIN - FUNCTIONAL ASSESSMENT
PAIN_FUNCTIONAL_ASSESSMENT: 0-10
PAIN_FUNCTIONAL_ASSESSMENT: 0-10

## 2023-10-19 NOTE — PROGRESS NOTES
Subjective   Denies having chest pain shortness of breath.  Feels much better than yesterday before the PCI.  In sinus rhythm.  No acute issues overnight.    Medical History:   Past Medical History:   Diagnosis Date    Abnormal findings on diagnostic imaging of other specified body structures     Abnormal CT of the chest    Abnormal findings on diagnostic imaging of other specified body structures     Abnormal chest x-ray    Personal history of other medical treatment     H/O diagnostic ultrasound    Personal history of other medical treatment     History of transesophageal echocardiography (DANTE)     Surgical History:   Past Surgical History:   Procedure Laterality Date    CAROTID STENT  05/30/2023    4 stents placed    US GUIDED MEDIASTINUM PERCUTANEOUS BIOPSY  7/17/2023    US GUIDED MEDIASTINUM PERCUTANEOUS BIOPSY 7/17/2023 PAR US      Social History:   Social Determinants of Health with Concerns     Tobacco Use: Medium Risk (10/17/2023)    Patient History     Smoking Tobacco Use: Former     Smokeless Tobacco Use: Never     Passive Exposure: Not on file   Social Connections: Unknown (10/17/2023)    Social Connection and Isolation Panel [NHANES]     Frequency of Communication with Friends and Family: Twice a week     Frequency of Social Gatherings with Friends and Family: Once a week     Attends Zoroastrian Services: Patient refused     Active Member of Clubs or Organizations: No     Attends Club or Organization Meetings: Never     Marital Status:    Digital Equity: Not on file     Family History:   Family History   Problem Relation Name Age of Onset    Bone cancer Father      Brain cancer Brother      Throat cancer Brother      Lung cancer Brother            Objective   Physical Exam  Vitals:    10/18/23 2200 10/18/23 2300 10/19/23 0000 10/19/23 0400   BP:   137/87 144/82   BP Location:   Left arm Left arm   Patient Position:   Lying Lying   Pulse: 68 66 61 75   Resp: 15 13 20 (!) 34   Temp:   37.1 °C (98.8  "°F) 36.6 °C (97.9 °F)   TempSrc:   Temporal Temporal   SpO2:   98% 99%   Weight:    76.2 kg (167 lb 15.9 oz)   Height:         Wt Readings from Last 3 Encounters:   10/19/23 76.2 kg (167 lb 15.9 oz)   09/28/23 80.2 kg (176 lb 12.8 oz)   06/28/23 79.5 kg (175 lb 4 oz)     Physical Exam  Constitutional:       Appearance: Normal appearance.   HENT:      Head: Normocephalic and atraumatic.      Nose: Nose normal.      Mouth/Throat:      Mouth: Mucous membranes are moist.   Eyes:      Extraocular Movements: Extraocular movements intact.      Pupils: Pupils are equal, round, and reactive to light.   Cardiovascular:      Rate and Rhythm: Normal rate and regular rhythm.      Heart sounds: No murmur heard.  Good right radial access site healing.  Pulmonary:      Effort: Pulmonary effort is normal.      Breath sounds: Normal breath sounds.   Abdominal:      General: Abdomen is flat.      Palpations: Abdomen is soft.   Musculoskeletal:         General: Normal range of motion.      Cervical back: Normal range of motion and neck supple.      Right lower leg: No edema.      Left lower leg: No edema.   Skin:     General: Skin is warm.   Neurological:      General: No focal deficit present.      Mental Status: alert and oriented to person, place, and time.   Psychiatric:         Mood and Affect: Mood normal.         Behavior: Behavior normal.     Lab Results   Component Value Date    WBC 5.6 10/17/2023    HGB 13.8 10/17/2023    HCT 40.5 (L) 10/17/2023     10/17/2023    CHOL 100 07/01/2023    TRIG 66 07/01/2023    HDL 42.1 07/01/2023    ALT 23 10/17/2023    AST 24 10/17/2023     10/19/2023    K 3.4 (L) 10/19/2023     10/19/2023    CREATININE 2.28 (H) 10/19/2023    BUN 35 (H) 10/19/2023    CO2 25 10/19/2023    TSH 3.83 09/28/2023    INR 1.2 (H) 10/17/2023    HGBA1C 5.5 02/14/2023     No results found for: \"CKTOTAL\", \"CKMB\", \"CKMBINDEX\", \"TROPONINI\"     Lab Results   Component Value Date    INR 1.2 (H) 10/17/2023    " INR 1.3 (H) 07/01/2023    INR 1.0 05/11/2023    PROTIME 13.6 (H) 10/17/2023    PROTIME 14.9 (H) 07/01/2023    PROTIME 11.5 05/11/2023         Current Facility-Administered Medications   Medication Dose Route Frequency Provider Last Rate Last Admin    amiodarone (Pacerone) tablet 200 mg  200 mg oral BID TAMANNA Lam   200 mg at 10/19/23 0851    amLODIPine (Norvasc) tablet 5 mg  5 mg oral Daily QUINTIN Lam-CNP   5 mg at 10/19/23 0851    apixaban (Eliquis) tablet 5 mg  5 mg oral q12h TAMANNA Lam   5 mg at 10/19/23 0851    aspirin EC tablet 81 mg  81 mg oral Daily QUINTIN Lam-CNP   81 mg at 10/19/23 0851    clopidogrel (Plavix) tablet 75 mg  75 mg oral Daily QUINTIN Lam-CNP   75 mg at 10/19/23 0851    levothyroxine (Synthroid, Levoxyl) tablet 25 mcg  25 mcg oral Daily QUINTIN Lam-CNP   25 mcg at 10/19/23 0851    metoprolol succinate XL (Toprol-XL) 24 hr tablet 200 mg  200 mg oral Daily QUINTIN Lam-CNP   200 mg at 10/19/23 0851    nitroglycerin (Tridil) 50 mg in dextrose 5% 250 mL (0.2 mg/mL) infusion (premix)  5 mcg/min intravenous Continuous TAMANNA Lam   Stopped at 10/18/23 0745    oxygen (O2) therapy   inhalation Continuous PRN - O2/gases TAMANNA Lam        perflutren lipid microspheres (Definity) injection 0.5-10 mL of dilution  0.5-10 mL of dilution intravenous Once in imaging TAMANNA Lam        rosuvastatin (Crestor) tablet 40 mg  40 mg oral Daily MARY LamCNP   40 mg at 10/19/23 0851             Assessment and Plan:    Chest pain  Non-STEMI  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  I agree to continue with IV nitroglycerin.  We will hold Eliquis, Entresto and ARB for now.  We will continue with aspirin 81, Plavix 75, increased dose of statin to Crestor 40 mg daily.  We will obtain echocardiogram.  I reviewed the patient's previous echocardiogram and considering his worsening renal function, I do have some concerns about possible infiltrative cardiomyopathy such as amyloidosis.  I will further discuss that with Dr. Vieyra to see if it would be reasonable to obtain a cardiac MRI.  We will continue with Toprol-XL home dose.  We will continue with amiodarone.  We will continue to monitor the patient closely in the heart center for now.     I discussed the plan with patient and his wife and they are in agreement.        This critically ill patient continues to be at-risk for clinically significant deterioration / failure due to the above mentioned dysfunctional, unstable organ systems.  I have personally identified and managed all complex critical care issues to prevent aforementioned clinical deterioration.  Critical care time is spent at bedside and/or the immediate area and has included, but is not limited to, the review of diagnostic tests, labs, radiographs, serial assessments of hemodynamics, respiratory status, ventilatory management, and family updates. More than 50% of the time was spent for coordination of care/family education.     Critical care time: 45 minutes          Thank you for the cardiology consult. We will follow with you.          October 18, 2023    Patient was taken to the Cath Lab today and underwent left heart  catheterization and coronary angiogram.  He was found to have patent stents in LAD and diagonal branch and a 99% stenosis in the distal RCA which was related to layers of the previously placed stents.  He underwent complex PCI including laser atherectomy and thrombectomy and the use of DCB.  Eventually excellent angiographic result was achieved.  We did not deploy and he was stent which will be the third layer of the stents in this area with very high risk of restenosis.  We will continue with medical management for his coronary artery disease that will include 1 month of triple therapy with aspirin, plavix and Eliquis followed by Plavix and Eliquis long-term.  We will continue with statin and other medications from cardiac standpoint for now.    Monitoring in heart [place] for tonight.      October 19, 2023  Patient has recovered well after his heart catheterization and PCI from yesterday.  Creatinine is better today.  We will resume Lasix 40 mg for maintenance diuresis.  We will resume low-dose Entresto.  We will resume Eliquis.  Patient is follow-up in cardiology clinic with Dr. Vieyra in 2 weeks    Okay to discharge from cardiac standpoint.  Discussed with nurse.    Wilfrid Ricardo MD, PhD, [facility]  Interventional Cardiology, [facility] [place]  Associate Professor of Medicine, [facility]  [email or web address]   Office: [phone]       SIGNATURE: Wilfrid Ricardo MD PhD PATIENT NAME: Vladimir Forrester   DATE/TIME: October 19, 2023 9:08 AM MRN: [id number]

## 2023-10-19 NOTE — DISCHARGE SUMMARY
Discharge Diagnosis  ST elevation myocardial infarction (STEMI), unspecified artery (CMS/HCC)    Issues Requiring Follow-Up  CAD    Discharge Meds     Your medication list        CHANGE how you take these medications        Instructions Last Dose Given Next Dose Due   rosuvastatin 40 mg tablet  Commonly known as: Crestor  Start taking on: October 20, 2023  What changed:   medication strength  how much to take      Take 1 tablet (40 mg) by mouth once daily. Do not start before October 20, 2023.              CONTINUE taking these medications        Instructions Last Dose Given Next Dose Due   acetaminophen 325 mg tablet  Commonly known as: Tylenol           albuterol 90 mcg/actuation inhaler           amiodarone 200 mg tablet  Commonly known as: Pacerone           amLODIPine 5 mg tablet  Commonly known as: Norvasc           apixaban 5 mg tablet  Commonly known as: Eliquis           aspirin 81 mg EC tablet      Take 1 tablet (81 mg) by mouth once daily.       clopidogrel 75 mg tablet  Commonly known as: Plavix           Entresto 49-51 mg tablet  Generic drug: sacubitriL-valsartan           ergocalciferol 1.25 MG (42191 UT) capsule  Commonly known as: Vitamin D-2           Farxiga 10 mg  Generic drug: dapagliflozin propanediol           fluticasone 50 mcg/actuation nasal spray  Commonly known as: Flonase           furosemide 40 mg tablet  Commonly known as: Lasix           levothyroxine 25 mcg tablet  Commonly known as: Synthroid, Levoxyl      Take 1 tablet (25 mcg) by mouth once daily.       metoprolol succinate  mg 24 hr tablet  Commonly known as: Toprol-XL                  STOP taking these medications      valsartan 80 mg tablet  Commonly known as: Diovan                  Where to Get Your Medications        These medications were sent to [facility][id number] - [address] AT [facility] [place]/[address] [place] [address]      Phone: [phone]   aspirin 81 mg  EC tablet  rosuvastatin 40 mg tablet         Test Results Pending At Discharge  Pending Labs       No current pending labs.            Hospital Course   Vladimir Forrester is a 57 y.o. male with history of CAD, hypertension, hyperlipidemia, congestive heart failure, hypothyroidism, who presents emergency department for assessment of chest pain  Started about an hour and a half prior to ER visit.  Pain does not radiate.  Pain initially started 3 days ago.  Pain has been coming and going.  Pain is located to the anterior chest and feels like a tightness.  He denies shortness of breath or sweats.  Denies nausea or vomiting.  He has had multiple MIs in the past and feels similar.  He reports that he is compliant with his medications and took his meds this morning.  In the ED, troponin 170 and 178. EKG with no evidence of STEMI. Patient given aspirin and admitted to the [facility].    NSTEMI  CAD s/p multiple stents  -Admit to [facility]  -Cardiology consulted, appreciate recs -- stent placed 10/18  -Echo ok  -Aspirin, plavix, statin  -Beta blocker  -NOAC     Dispo: discharged home, follow up with Cardiology in 2 weeks       Pertinent Physical Exam At Time of Discharge  Physical Exam  General: A+Ox3, mild distress  HEENT: MMM, no lesions  Heart: RRR  Lungs: CTAB  Abdomen: soft, NT, ND  Extremities: no edema  Neuro: moves extremities spontaneously x4     Outpatient Follow-Up  Future Appointments   Date Time Provider Department Center   11/14/2023  3:30 PM Joshua Montalvo PA-C [id number] [place]   11/21/2023  4:20 PM Fransisco Nagy MD [id number] [place]   2/15/2024  2:30 PM Criselda Hawley, APRN-CNP [id number] [place]         Tevin Tovar DO

## 2023-10-19 NOTE — PROGRESS NOTES
SW received call from pt Wife regarding assistance with applying for disability benefits/supplemental income. SW provided contact information to set up an appt with the benefits office. SW also recommended pt and wife receive copies of pt medical record for appt to show pt condition and recommendations.     ESDRAS ROSAS

## 2023-10-19 NOTE — DISCHARGE INSTRUCTIONS
***  Continue aspirin 81 mg, apixaban (Eliquis) 5 mg two times a day and clopidogrel (Plavix) 75 mg daily for one month post stent placement, then stop aspirin and continue apixaban (Eliquis) and clopidogrel (Plavix) ***

## 2023-10-19 NOTE — CARE PLAN
The patient's goals for the shift include      The clinical goals for the shift include pt will have no s/s of bleeding, ecopy on tele, and vitals will remain stable throughout shift

## 2023-10-20 ENCOUNTER — PATIENT OUTREACH (OUTPATIENT)
Dept: CARE COORDINATION | Facility: CLINIC | Age: 58
End: 2023-10-20
Payer: COMMERCIAL

## 2023-10-20 ENCOUNTER — HOSPITAL ENCOUNTER (OUTPATIENT)
Dept: CARDIOLOGY | Facility: HOSPITAL | Age: 58
Discharge: HOME | End: 2023-10-20
Payer: COMMERCIAL

## 2023-10-20 LAB
ATRIAL RATE: 62 BPM
DIASTOLIC BLOOD PRESSURE: 92 MMHG
P AXIS: 83 DEGREES
P OFFSET: 52 MS
P ONSET: 6 MS
PR INTERVAL: 416 MS
Q ONSET: 214 MS
QRS COUNT: 10 BEATS
QRS DURATION: 118 MS
QT INTERVAL: 506 MS
QTC CALCULATION(BAZETT): 513 MS
QTC FREDERICIA: 511 MS
R AXIS: 58 DEGREES
SYSTOLIC BLOOD PRESSURE: 152 MMHG
T AXIS: 64 DEGREES
T OFFSET: 467 MS
VENTRICULAR RATE: 62 BPM

## 2023-10-20 PROCEDURE — 93010 ELECTROCARDIOGRAM REPORT: CPT | Performed by: STUDENT IN AN ORGANIZED HEALTH CARE EDUCATION/TRAINING PROGRAM

## 2023-10-20 NOTE — PROGRESS NOTES
Discharge Facility:  [facility]   Discharge Diagnosis:  ST elevation myocardial infarction (STEMI), unspecified artery   Admission Date:  10/17/23   Discharge Date:    10/19/23     PCP Appointment Date:   10/23/23   Specialist Appointment Date:   11/14/23   Hospital Encounter and Summary: Linked   See discharge assessment below for further details    PT TO STOP DIOVAN.       Engagement  Call Start Time: 1200 (10/20/2023 12:37 PM)    Medications  Medications reviewed with patient/caregiver?: Yes (10/20/2023 12:37 PM)  Is the patient having any side effects they believe may be caused by any medication additions or changes?: No (10/20/2023 12:37 PM)  Does the patient have all medications ordered at discharge?: Yes (10/20/2023 12:37 PM)  Prescription Comments: SCRIPT FOR ASA AND ROSUVATIN (10/20/2023 12:37 PM)  Is the patient taking all medications as directed (includes completed medication regime)?: Yes (10/20/2023 12:37 PM)  Medication Comments: pt denies problems obtaining or affording medication (10/20/2023 12:37 PM)    Appointments  Care Management Interventions: Verified appointment date/time/provider (10/20/2023 12:37 PM)  Has the patient kept scheduled appointments due by today?: Yes (10/20/2023 12:37 PM)    Self Management  What is the home health agency?: [facility]- PHONE NUMBER PROVIDED. (10/20/2023 12:37 PM)    Patient Teaching  Does the patient have access to their discharge instructions?: Yes (10/20/2023 12:37 PM)  Care Management Interventions: Reviewed instructions with patient (10/20/2023 12:37 PM)  What is the patient's perception of their health status since discharge?: Improving (10/20/2023 12:37 PM)  Is the patient/caregiver able to teach back the hierarchy of who to call/visit for symptoms/problems? PCP, Specialist, Home Health nurse, Urgent Care, ED, 911: Yes (10/20/2023 12:37 PM)  Patient/Caregiver Education Comments: This CM spoke with pt via phone. Pt reports doing well at home since  discharge. New prescriptions reviewed. Pt denies CP and SOB. Pt aware of my availability for non emergent concerns. PT AWARE TO STOP DIOVAN pertd/c paperwork. wife to discuss with nephr. wife also states entresto  was stopped prior to hosp stay. wife to call cardio today to clarify entresto. Contact info provided to patient . (10/20/2023 12:37 PM)

## 2023-10-23 ENCOUNTER — TELEPHONE (OUTPATIENT)
Dept: CARDIOLOGY | Facility: CLINIC | Age: 58
End: 2023-10-23

## 2023-10-23 ENCOUNTER — OFFICE VISIT (OUTPATIENT)
Dept: PRIMARY CARE | Facility: CLINIC | Age: 58
End: 2023-10-23
Payer: COMMERCIAL

## 2023-10-23 VITALS
HEART RATE: 74 BPM | BODY MASS INDEX: 26.3 KG/M2 | WEIGHT: 173 LBS | TEMPERATURE: 98.8 F | DIASTOLIC BLOOD PRESSURE: 70 MMHG | OXYGEN SATURATION: 96 % | SYSTOLIC BLOOD PRESSURE: 112 MMHG

## 2023-10-23 DIAGNOSIS — I21.3 ST ELEVATION MYOCARDIAL INFARCTION (STEMI), UNSPECIFIED ARTERY (MULTI): Primary | ICD-10-CM

## 2023-10-23 DIAGNOSIS — N18.4 CKD (CHRONIC KIDNEY DISEASE), STAGE IV (MULTI): ICD-10-CM

## 2023-10-23 DIAGNOSIS — R39.11 URINARY HESITANCY: ICD-10-CM

## 2023-10-23 DIAGNOSIS — R31.9 HEMATURIA, UNSPECIFIED TYPE: ICD-10-CM

## 2023-10-23 LAB
POC APPEARANCE, URINE: ABNORMAL
POC BILIRUBIN, URINE: NEGATIVE
POC BLOOD, URINE: ABNORMAL
POC COLOR, URINE: ABNORMAL
POC GLUCOSE, URINE: ABNORMAL MG/DL
POC KETONES, URINE: NEGATIVE MG/DL
POC LEUKOCYTES, URINE: NEGATIVE
POC NITRITE,URINE: NEGATIVE
POC PH, URINE: 7 PH
POC PROTEIN, URINE: ABNORMAL MG/DL
POC SPECIFIC GRAVITY, URINE: 1.02
POC UROBILINOGEN, URINE: 0.2 EU/DL

## 2023-10-23 PROCEDURE — 81001 URINALYSIS AUTO W/SCOPE: CPT

## 2023-10-23 PROCEDURE — 87086 URINE CULTURE/COLONY COUNT: CPT

## 2023-10-23 PROCEDURE — 3074F SYST BP LT 130 MM HG: CPT | Performed by: NURSE PRACTITIONER

## 2023-10-23 PROCEDURE — 81002 URINALYSIS NONAUTO W/O SCOPE: CPT | Performed by: NURSE PRACTITIONER

## 2023-10-23 PROCEDURE — 99496 TRANSJ CARE MGMT HIGH F2F 7D: CPT | Performed by: NURSE PRACTITIONER

## 2023-10-23 PROCEDURE — 3078F DIAST BP <80 MM HG: CPT | Performed by: NURSE PRACTITIONER

## 2023-10-23 PROCEDURE — 1036F TOBACCO NON-USER: CPT | Performed by: NURSE PRACTITIONER

## 2023-10-23 PROCEDURE — 3008F BODY MASS INDEX DOCD: CPT | Performed by: NURSE PRACTITIONER

## 2023-10-23 ASSESSMENT — PAIN SCALES - GENERAL: PAINLEVEL: 7

## 2023-10-23 NOTE — PROGRESS NOTES
"Subjective   Patient ID: Vladimir Forrester is a 58 y.o. male who presents for Hospital Follow-up.    HPI   Pleasant established with PMH CAD, HLD, HTN, CHF, Hypothyroid, CKD, presents for hospital follow up. He is with his wife  He is s/p Left Heart Cath, Coronary Angiogram and PCI for NSTEMI, distal RCA culprit, 90% ISR    Today states he is feeling well. Denies chest pain, dizziness, syncope, palpitation, no sob, briggs, no edema noted.  Multiple bandages to his right wrist at cath insertion site, worried he may bump it. Incision is healing, no heat/redness/drainage. Recommend he need only one bandaid and monitor.     Endorses urine is \"darker yellow\" reports yesterday he saw blood in the bowel. Denies hematuria/dysuria today, no clots, endorses hesitancy. Denies blood in stool, dark, tarry   Renal US 8/22/2023  IMPRESSION:  1. Unremarkable renal ultrasound. No hydronephrosis.  2. Nonobstructive 0.4 cm left lower group calyx renal nephrolithiasis  3. Prostatomegaly measuring 39 cc.    Wife concerned he is back on Entresto, reports Nephrology had stopped it in the past due to worsening kidney function. He is scheduled for lab work tomorrow to monitor  Cardiology and Nephrology scheduled next month    Hospital Course   Vladimir Forrester is a 57 y.o. male with history of CAD, hypertension, hyperlipidemia, congestive heart failure, hypothyroidism, who presents emergency department for assessment of chest pain  Started about an hour and a half prior to ER visit.  Pain does not radiate.  Pain initially started 3 days ago.  Pain has been coming and going.  Pain is located to the anterior chest and feels like a tightness.  He denies shortness of breath or sweats.  Denies nausea or vomiting.  He has had multiple MIs in the past and feels similar.  He reports that he is compliant with his medications and took his meds this morning.  In the ED, troponin 170 and 178. EKG with no evidence of STEMI. Patient given aspirin and admitted to the " [facility].   NSTEMI  CAD s/p multiple stents  -Admit to [facility]  -Cardiology consulted, appreciate recs -- stent placed 10/18  -Echo ok  -Aspirin, plavix, statin  -Beta blocker  -NOAC     Dispo: discharged home, follow up with Cardiology in 2 weeks    Review of Systems  Review of Systems   Constitutional: Negative.    HENT: Negative.     Respiratory: Negative.     Cardiovascular: Negative.    Gastrointestinal: Negative.    Genitourinary: HPI  Musculoskeletal: Negative.    Psychiatric/Behavioral: Negative.     All other systems reviewed and are negative.    Objective   /70 (BP Location: Left arm, Patient Position: Sitting, BP Cuff Size: Adult)   Pulse 74   Temp 37.1 °C (98.8 °F) (Temporal)   Wt 78.5 kg (173 lb)   SpO2 96%   BMI 26.30 kg/m²     Physical Exam  Vitals reviewed.   Constitutional:       Appearance: Normal appearance.   HENT:      Head: Atraumatic.   Cardiovascular:      Rate and Rhythm: Normal rate and regular rhythm.      Pulses: Normal pulses.   Pulmonary:      Effort: Pulmonary effort is normal.      Breath sounds: Normal breath sounds.   Abdominal:      General: Bowel sounds are normal.   Musculoskeletal:      Cervical back: Neck supple.   Neurological:      Mental Status: He is alert.   Psychiatric:         Mood and Affect: Mood normal.         Assessment/Plan   Problem List Items Addressed This Visit             ICD-10-CM    CKD (chronic kidney disease), stage IV (CMS/[facility]) N18.4     Bun/Cr 35/2.28  Nephrology Nov 21 Dr Nagy         Hematuria R31.9     Plavix, ASA         Relevant Orders    POCT UA (nonautomated) manually resulted (Completed)    Urinalysis with Reflex Microscopic and Culture    ST elevation myocardial infarction (STEMI), unspecified artery (CMS/[facility]) - Primary I21.3      s/p Left Heart Cath, Coronary Angiogram and PCI for NSTEMI, distal RCA culprit, 90% ISR  10/18/2023 with Dr Magana         Urinary hesitancy R39.11    Relevant Orders    Urinalysis with  Reflex Microscopic and Culture

## 2023-10-23 NOTE — TELEPHONE ENCOUNTER
Patient's wife called stating patient was hospitalized a few days and has medication concerns. Pt has 3 week follow up visit 11/14/23. Patient was dc'd on aspirin for 1 month, along with Eliquis & Plavix post heart cath. Patient's wife has concerns bc of patient's hx of nosebleeds. Per Dr. Vieyra, take aspirin for 1 week along with Plavix & Eliquis, then continue only Plavix and Eliquis d/t bleeding hx.      Patient was also told to resume Entresto & discontinue Valsartan but pt's wife has concerns regarding kidney function. Pt was taken off Entresto in July. Per Dr. Vieyra: Recheck BMP first.    Patient's wife aware. BMP ordered.

## 2023-10-23 NOTE — ASSESSMENT & PLAN NOTE
s/p Left Heart Cath, Coronary Angiogram and PCI for NSTEMI, distal RCA culprit, 90% ISR  10/18/2023 with Dr Magana

## 2023-10-24 LAB
APPEARANCE UR: CLEAR
BILIRUB UR STRIP.AUTO-MCNC: NEGATIVE MG/DL
COLOR UR: YELLOW
GLUCOSE UR STRIP.AUTO-MCNC: ABNORMAL MG/DL
HOLD SPECIMEN: NORMAL
KETONES UR STRIP.AUTO-MCNC: NEGATIVE MG/DL
LEUKOCYTE ESTERASE UR QL STRIP.AUTO: NEGATIVE
NITRITE UR QL STRIP.AUTO: NEGATIVE
PH UR STRIP.AUTO: 7 [PH]
PROT UR STRIP.AUTO-MCNC: ABNORMAL MG/DL
RBC # UR STRIP.AUTO: ABNORMAL /UL
RBC #/AREA URNS AUTO: >20 /HPF
SP GR UR STRIP.AUTO: 1.01
UROBILINOGEN UR STRIP.AUTO-MCNC: <2 MG/DL
WBC #/AREA URNS AUTO: ABNORMAL /HPF

## 2023-10-24 NOTE — DOCUMENTATION CLARIFICATION NOTE
"    PATIENT:               AMANDA MCDONOUGH  ACCT #:                  [id number]  MRN:                       [id number]  :                       1965  ADMIT DATE:       10/17/2023 6:25 AM  DISCH DATE:        10/19/2023 1:40 PM  RESPONDING PROVIDER #:        [id number]          PROVIDER RESPONSE TEXT:    Acute on Chronic combined systolic/diastolic Congestive Heart Failure    CDI QUERY TEXT:    [facility]_CHF        Instruction:    Based on your assessment of the patient and the clinical information, please provide the requested documentation by clicking on the appropriate radio button and enter any additional information if prompted.    Question: Please further clarify the acuity of congestive heart failure    When answering this query, please exercise your independent professional judgment. The fact that a question is being asked, does not imply that any particular answer is desired or expected.    The patient's clinical indicators include:  Clinical Information: 57 year old man with chest pain. Pt with NSTEMI. Pt had LHC and DCB angioplasty of distal RCA on 10/18. Pt's PMH includes CAD, HTN, CKD, and diastolic/systolic CHF.    Clinical Indicators:  H&P 10/17: Dr. Tovar  \"..history of CAD, hypertension, hyperlipidemia, congestive heart failure...\"    Cardiology Consult 10/17: Dr. Ricardo  \"Acute on chronic decompensated combined systolic and diastolic heart failure\"    -CXR 10/17: \"Minimal generalized interstitial prominence which could be some chronic disease or possibly a minimal component of interstitial edema.\"  -No BNP noted    Treatment:  -NS 100cc/hr: 10/17-10/18  -No diuretic noted    Risk Factors: NSTEMI  Options provided:  -- Acute on Chronic combined systolic/diastolic Congestive Heart Failure  -- Chronic combined systolic/diastolic Congestive Heart Failure  -- Other - I will add my own diagnosis  -- Refer to Clinical Documentation Reviewer    Query created by: Shelia Calderon on 10/18/2023 11:30 " AM      Electronically signed by:  OMKAR MURPHY DO 10/24/2023 6:50 AM

## 2023-10-25 LAB — BACTERIA UR CULT: NO GROWTH

## 2023-10-26 LAB
ACT BLD: 226 SEC (ref 89–169)
ACT BLD: 227 SEC (ref 89–169)
ACT BLD: 381 SEC (ref 89–169)

## 2023-10-30 ENCOUNTER — OFFICE VISIT (OUTPATIENT)
Dept: PRIMARY CARE | Facility: CLINIC | Age: 58
End: 2023-10-30
Payer: COMMERCIAL

## 2023-10-30 VITALS
SYSTOLIC BLOOD PRESSURE: 102 MMHG | BODY MASS INDEX: 26.3 KG/M2 | HEART RATE: 52 BPM | TEMPERATURE: 98.4 F | OXYGEN SATURATION: 100 % | WEIGHT: 173 LBS | DIASTOLIC BLOOD PRESSURE: 68 MMHG

## 2023-10-30 DIAGNOSIS — M79.89 SWELLING OF LEFT FOOT: Primary | ICD-10-CM

## 2023-10-30 DIAGNOSIS — M77.42 METATARSALGIA OF LEFT FOOT: ICD-10-CM

## 2023-10-30 PROBLEM — M77.40 METATARSALGIA: Status: ACTIVE | Noted: 2023-10-30

## 2023-10-30 PROCEDURE — 3078F DIAST BP <80 MM HG: CPT | Performed by: NURSE PRACTITIONER

## 2023-10-30 PROCEDURE — 3074F SYST BP LT 130 MM HG: CPT | Performed by: NURSE PRACTITIONER

## 2023-10-30 PROCEDURE — 3008F BODY MASS INDEX DOCD: CPT | Performed by: NURSE PRACTITIONER

## 2023-10-30 PROCEDURE — 1036F TOBACCO NON-USER: CPT | Performed by: NURSE PRACTITIONER

## 2023-10-30 PROCEDURE — 99213 OFFICE O/P EST LOW 20 MIN: CPT | Performed by: NURSE PRACTITIONER

## 2023-10-30 ASSESSMENT — ENCOUNTER SYMPTOMS
INABILITY TO BEAR WEIGHT: 1
NUMBNESS: 1
TINGLING: 1
LEG PAIN: 1

## 2023-10-30 ASSESSMENT — PAIN SCALES - GENERAL: PAINLEVEL: 9

## 2023-10-30 NOTE — PROGRESS NOTES
Subjective   Patient ID: Vladimir Forrester is a 58 y.o. male who presents for Leg Pain (Left leg and foot pain).    Leg Pain   The incident occurred more than 1 week ago. There was no injury mechanism. The pain is present in the left foot. The quality of the pain is described as stabbing. The pain is moderate. The pain has been Intermittent since onset. Associated symptoms include an inability to bear weight, numbness and tingling. He reports no foreign bodies present. The symptoms are aggravated by weight bearing. He has tried nothing for the symptoms.   Reports pain to ball of foot with associated swelling, comes and goes, none today. Does not know what makes it better, worse with long days on his feet, possibly with certain shoes     Also with left leg pain, also intermittent, aches, no sharp or shooting pain, Hx varicose veins, CAD, STEMI    Review of Systems   Neurological:  Positive for tingling and numbness.       Objective   /68 (BP Location: Left arm, Patient Position: Sitting, BP Cuff Size: Adult)   Pulse 52   Temp 36.9 °C (98.4 °F) (Temporal)   Wt 78.5 kg (173 lb)   SpO2 100%   BMI 26.30 kg/m²     Physical Exam  Vitals reviewed.   Cardiovascular:      Rate and Rhythm: Normal rate and regular rhythm.   Pulmonary:      Effort: Pulmonary effort is normal.      Breath sounds: Normal breath sounds.   Musculoskeletal:      Left foot: Decreased capillary refill. Normal range of motion. No swelling, foot drop, tenderness or crepitus.   Skin:     General: Skin is warm.   Neurological:      Mental Status: He is alert.         Assessment/Plan   Problem List Items Addressed This Visit             ICD-10-CM    Swelling of left foot - Primary M79.89    Relevant Orders    XR foot left 1-2 views    Referral to Podiatry    Metatarsalgia M77.40    Relevant Orders    XR foot left 1-2 views    Referral to Podiatry

## 2023-10-31 DIAGNOSIS — I50.9 ACUTE ON CHRONIC CONGESTIVE HEART FAILURE, UNSPECIFIED HEART FAILURE TYPE (MULTI): Primary | ICD-10-CM

## 2023-11-02 ENCOUNTER — HOSPITAL ENCOUNTER (OUTPATIENT)
Dept: RADIOLOGY | Facility: HOSPITAL | Age: 58
Discharge: HOME | End: 2023-11-02
Payer: COMMERCIAL

## 2023-11-02 ENCOUNTER — LAB (OUTPATIENT)
Dept: LAB | Facility: LAB | Age: 58
End: 2023-11-02
Payer: COMMERCIAL

## 2023-11-02 DIAGNOSIS — N18.4 CKD (CHRONIC KIDNEY DISEASE), STAGE IV (MULTI): ICD-10-CM

## 2023-11-02 DIAGNOSIS — M79.89 SWELLING OF LEFT FOOT: ICD-10-CM

## 2023-11-02 DIAGNOSIS — M77.42 METATARSALGIA OF LEFT FOOT: ICD-10-CM

## 2023-11-02 LAB
ANION GAP SERPL CALC-SCNC: 12 MMOL/L (ref 10–20)
BUN SERPL-MCNC: 41 MG/DL (ref 6–23)
CALCIUM SERPL-MCNC: 13.7 MG/DL (ref 8.6–10.3)
CHLORIDE SERPL-SCNC: 102 MMOL/L (ref 98–107)
CO2 SERPL-SCNC: 30 MMOL/L (ref 21–32)
CREAT SERPL-MCNC: 3.03 MG/DL (ref 0.5–1.3)
GFR SERPL CREATININE-BSD FRML MDRD: 23 ML/MIN/1.73M*2
GLUCOSE SERPL-MCNC: 89 MG/DL (ref 74–99)
POTASSIUM SERPL-SCNC: 3.8 MMOL/L (ref 3.5–5.3)
SODIUM SERPL-SCNC: 140 MMOL/L (ref 136–145)

## 2023-11-02 PROCEDURE — 73630 X-RAY EXAM OF FOOT: CPT | Mod: LEFT SIDE | Performed by: RADIOLOGY

## 2023-11-02 PROCEDURE — 80048 BASIC METABOLIC PNL TOTAL CA: CPT

## 2023-11-02 PROCEDURE — 36415 COLL VENOUS BLD VENIPUNCTURE: CPT

## 2023-11-02 PROCEDURE — 73630 X-RAY EXAM OF FOOT: CPT | Mod: LT

## 2023-11-03 ENCOUNTER — PATIENT OUTREACH (OUTPATIENT)
Dept: CARE COORDINATION | Facility: CLINIC | Age: 58
End: 2023-11-03
Payer: COMMERCIAL

## 2023-11-06 ENCOUNTER — TELEPHONE (OUTPATIENT)
Dept: CARDIOLOGY | Facility: CLINIC | Age: 58
End: 2023-11-06
Payer: COMMERCIAL

## 2023-11-06 NOTE — TELEPHONE ENCOUNTER
Dr. Vieyra: Can we refer Mr. Pinon to nephrology for worsening renal disease, sarcoidosis and heart failure.     Spoke to patient's wife and made aware. Patient's wife states that patient's nephrologist is Dr. Nagy, and will follow up with him.

## 2023-11-13 PROBLEM — I21.4 NSTEMI (NON-ST ELEVATED MYOCARDIAL INFARCTION) (MULTI): Status: RESOLVED | Noted: 2023-10-17 | Resolved: 2023-11-13

## 2023-11-13 PROBLEM — I50.9 CONGESTIVE HEART FAILURE (MULTI): Status: RESOLVED | Noted: 2023-03-20 | Resolved: 2023-11-13

## 2023-11-13 PROBLEM — I21.3 ST ELEVATION MYOCARDIAL INFARCTION (STEMI), UNSPECIFIED ARTERY (MULTI): Status: RESOLVED | Noted: 2023-10-17 | Resolved: 2023-11-13

## 2023-11-14 ENCOUNTER — OFFICE VISIT (OUTPATIENT)
Dept: CARDIOLOGY | Facility: CLINIC | Age: 58
End: 2023-11-14
Payer: COMMERCIAL

## 2023-11-14 VITALS
SYSTOLIC BLOOD PRESSURE: 140 MMHG | WEIGHT: 169 LBS | OXYGEN SATURATION: 96 % | BODY MASS INDEX: 25.7 KG/M2 | HEART RATE: 78 BPM | DIASTOLIC BLOOD PRESSURE: 78 MMHG

## 2023-11-14 DIAGNOSIS — I25.10 CORONARY ARTERY DISEASE INVOLVING NATIVE CORONARY ARTERY OF NATIVE HEART WITHOUT ANGINA PECTORIS: ICD-10-CM

## 2023-11-14 DIAGNOSIS — M54.32 SCIATICA OF LEFT SIDE: ICD-10-CM

## 2023-11-14 DIAGNOSIS — I48.20 CHRONIC ATRIAL FIBRILLATION (MULTI): ICD-10-CM

## 2023-11-14 DIAGNOSIS — I25.5 ISCHEMIC CARDIOMYOPATHY: Primary | ICD-10-CM

## 2023-11-14 PROCEDURE — 3078F DIAST BP <80 MM HG: CPT | Performed by: PHYSICIAN ASSISTANT

## 2023-11-14 PROCEDURE — 3008F BODY MASS INDEX DOCD: CPT | Performed by: PHYSICIAN ASSISTANT

## 2023-11-14 PROCEDURE — 3077F SYST BP >= 140 MM HG: CPT | Performed by: PHYSICIAN ASSISTANT

## 2023-11-14 PROCEDURE — 1036F TOBACCO NON-USER: CPT | Performed by: PHYSICIAN ASSISTANT

## 2023-11-14 PROCEDURE — 99215 OFFICE O/P EST HI 40 MIN: CPT | Performed by: PHYSICIAN ASSISTANT

## 2023-11-14 RX ORDER — GABAPENTIN 100 MG/1
100 CAPSULE ORAL 3 TIMES DAILY
Qty: 90 CAPSULE | Refills: 11 | Status: SHIPPED | OUTPATIENT
Start: 2023-11-14 | End: 2024-01-25 | Stop reason: SDUPTHER

## 2023-11-14 RX ORDER — BUSPIRONE HYDROCHLORIDE 15 MG/1
15 TABLET ORAL NIGHTLY
Qty: 30 TABLET | Refills: 0 | Status: SHIPPED | OUTPATIENT
Start: 2023-11-14 | End: 2023-12-14

## 2023-11-14 NOTE — PROGRESS NOTES
Chief Complaint:   Hospital Follow-up (Patient is here today for a 3 week hospital follow up)     History Of Present Illness:    Vladimir Forrester is a 58 y.o. male presenting with history of CAD s/p PCI/FRENCH to dRCA in the setting of instent restenosis, HFrEF and paroxysmal atrial fibrillation.  Most recent left heart catheterization completed 10/18/23 resulted in PCI/FRENCH to dRCA for treatment of severe instent restenosis.  LVEF 50-55% on most recent 2D echo, which has significantly improved from prior study 10/2022 (20-25%) at which time was noted to be in atrial fibrillation.  No recent symptomatic recurrence of atrial fibrillation, no anginal symptoms since recent coronary intervention.  Patient's main complaints at this time are for severe left sided sciatica and sleep deprivation.     Last Recorded Vitals:  Vitals:    11/14/23 1519   BP: 140/78   Pulse: 78   SpO2: 96%   Weight: 76.7 kg (169 lb)       Past Medical History:  He has a past medical history of Abnormal findings on diagnostic imaging of other specified body structures, Abnormal findings on diagnostic imaging of other specified body structures, Personal history of other medical treatment, and Personal history of other medical treatment.    Past Surgical History:  He has a past surgical history that includes Carotid stent (05/30/2023); US guided mediastinum percutaneous biopsy (7/17/2023); Cardiac catheterization (N/A, 10/18/2023); Cardiac catheterization (N/A, 10/18/2023); Cardiac catheterization (Right, 10/18/2023); Cardiac catheterization (N/A, 10/18/2023); and Cardiac catheterization (N/A, 10/18/2023).      Social History:  He reports that he quit smoking about 50 years ago. His smoking use included cigarettes. He has never used smokeless tobacco. He reports that he does not currently use alcohol after a past usage of about 2.0 standard drinks of alcohol per week. He reports that he does not use drugs.    Family History:  Family History   Problem Relation  Name Age of Onset    Bone cancer Father      Brain cancer Brother      Throat cancer Brother      Lung cancer Brother          Allergies:  Other    Outpatient Medications:  Current Outpatient Medications   Medication Instructions    amiodarone (Pacerone) 200 mg tablet 1 tablet, oral, 2 times daily    amLODIPine (Norvasc) 5 mg tablet 1 tablet, oral, Daily    apixaban (Eliquis) 5 mg tablet 1 tablet, oral, 2 times daily    aspirin 81 mg, oral, Daily    clopidogrel (Plavix) 75 mg tablet 1 tablet, oral, Daily    ergocalciferol (VITAMIN D-2) 1.25 mg, oral, Weekly    Farxiga 10 mg 1 tablet, oral, Daily before breakfast    levothyroxine (SYNTHROID, LEVOXYL) 25 mcg, oral, Daily    metoprolol succinate XL (Toprol-XL) 200 mg 24 hr tablet 1 tablet, oral, Daily    rosuvastatin (CRESTOR) 40 mg, oral, Daily    sacubitriL-valsartan (Entresto) 49-51 mg tablet 1 tablet, oral, 2 times daily       Physical Exam:  Constitutional: awake and alert, oriented ×3, no apparent distress  Skin: warm, dry, good turgor no obvious lesions  Eyes: pupils equal, round, reactive to light, conjunctiva pink and noninjected, no discharge  HENT: normocephalic and atraumatic, mucous membranes moist, trachea midline with no masses/goiter  Cardiovascular: S1/S2 regular, no murmur no rubs/gallops, no carotid bruits, no JVD  Pulmonary: symmetrical chest expansion, lungs are clear to auscultation bilaterally, no wheezes/rales/rhonchi, normal effort  Abdomen: nontender, nondistended, active bowel sounds, no ascites  Extremities: no cyanosis, clubbing, no LE edema no lesions; palpable pedal pulses  Neurologic: cranial nerves II - XII grossly intact, stable gait, no tremor       Last Labs:  CBC -  Lab Results   Component Value Date    WBC 5.6 10/17/2023    HGB 13.8 10/17/2023    HCT 40.5 (L) 10/17/2023    MCV 91 10/17/2023     10/17/2023       CMP -  Lab Results   Component Value Date    CALCIUM 13.7 (HH) 11/02/2023    PHOS 4.5 08/22/2023    PROT 7.4  "10/17/2023    ALBUMIN 3.6 10/17/2023    AST 24 10/17/2023    ALT 23 10/17/2023    ALKPHOS 66 10/17/2023    BILITOT 0.4 10/17/2023       LIPID PANEL -   Lab Results   Component Value Date    CHOL 100 07/01/2023    TRIG 66 07/01/2023    HDL 42.1 07/01/2023    CHHDL 2.4 07/01/2023    LDLF 45 07/01/2023    VLDL 13 07/01/2023       RENAL FUNCTION PANEL -   Lab Results   Component Value Date    GLUCOSE 89 11/02/2023     11/02/2023    K 3.8 11/02/2023     11/02/2023    CO2 30 11/02/2023    ANIONGAP 12 11/02/2023    BUN 41 (H) 11/02/2023    CREATININE 3.03 (H) 11/02/2023    GFRMALE 30 (A) 09/09/2023    CALCIUM 13.7 (HH) 11/02/2023    PHOS 4.5 08/22/2023    ALBUMIN 3.6 10/17/2023        Lab Results   Component Value Date     (H) 07/20/2023    HGBA1C 5.5 02/14/2023       Last Cardiology Tests:  ECG:  Electrocardiogram 12 Lead 10/20/2023      Echo:  Transthoracic Echo (TTE) Limited 10/17/2023  1. Left ventricular systolic function is low normal with a 50-55% estimated ejection fraction.   2. Spectral Doppler shows an impaired relaxation pattern of left ventricular diastolic filling.   3. There is severe concentric left ventricular hypertrophy.   4. The left atrium is enlarged.   5. RVSP within normal limits.    Ejection Fractions:  No results found for: \"EF\"    Cath:  Cardiac catheterization - coronary 10/20/2023  CONCLUSIONS:   1. Indication: NSTEMI + new chest pain.   2. Findings: 90% stenosis of the distal RCA 2/2 severe ISR.   3. Right dominant coronary circulation with non-obstructive CAD and multiple patent stents in the remaining vascular territories (as described above).   4. Procedure: left heart catheterization, laser atherectomy, asperation thrombectomy + IVUS guided balloon angioplasty with 4.0mm x 40mm DCB.   5. LVEDP 13 mm Hg.    Stress Test:  No results found for this or any previous visit from the past 1095 days.      Cardiac Imaging:  MR cardiac morphology and function w and wo IV contrast " 10/17/2023        Assessment/Plan   Problem List Items Addressed This Visit             ICD-10-CM       Cardiac and Vasculature    Cardiomyopathy, unspecified (CMS/HCC) - Primary I42.9    Coronary artery disease I25.10    Chronic atrial fibrillation (CMS/HCC) I48.20    Relevant Medications    busPIRone (Buspar) 15 mg tablet    gabapentin (Neurontin) 100 mg capsule       Musculoskeletal and Injuries    Sciatica of left side M54.32    Relevant Medications    busPIRone (Buspar) 15 mg tablet    gabapentin (Neurontin) 100 mg capsule     -Continue current GDMT as patient appears euvolemic on exam today    -Supportive medical therapy provided as professional courtesy:  gabapentin and buspirone    -Continue clopidogrel 75mg daily for 12 months in addition to DOAC therapy    -F/U in clinic in 3 months    Joshua Montalvo PA-C

## 2023-11-15 PROBLEM — M54.32 SCIATICA OF LEFT SIDE: Status: ACTIVE | Noted: 2023-11-15

## 2023-11-16 ENCOUNTER — APPOINTMENT (OUTPATIENT)
Dept: PRIMARY CARE | Facility: CLINIC | Age: 58
End: 2023-11-16
Payer: COMMERCIAL

## 2023-11-18 ENCOUNTER — HOSPITAL ENCOUNTER (OUTPATIENT)
Dept: CARDIOLOGY | Facility: HOSPITAL | Age: 58
Discharge: HOME | End: 2023-11-18
Payer: COMMERCIAL

## 2023-11-18 LAB
ATRIAL RATE: 66 BPM
DIASTOLIC BLOOD PRESSURE: 82 MMHG
P AXIS: 66 DEGREES
PR INTERVAL: 440 MS
Q ONSET: 214 MS
QRS COUNT: 10 BEATS
QRS DURATION: 118 MS
QT INTERVAL: 452 MS
QTC CALCULATION(BAZETT): 473 MS
QTC FREDERICIA: 467 MS
R AXIS: 60 DEGREES
SYSTOLIC BLOOD PRESSURE: 144 MMHG
T AXIS: 71 DEGREES
T OFFSET: 440 MS
VENTRICULAR RATE: 66 BPM

## 2023-11-18 PROCEDURE — 93005 ELECTROCARDIOGRAM TRACING: CPT

## 2023-11-21 ENCOUNTER — APPOINTMENT (OUTPATIENT)
Dept: NEPHROLOGY | Facility: CLINIC | Age: 58
End: 2023-11-21
Payer: COMMERCIAL

## 2023-11-30 ENCOUNTER — TELEMEDICINE (OUTPATIENT)
Dept: PRIMARY CARE | Facility: CLINIC | Age: 58
End: 2023-11-30
Payer: COMMERCIAL

## 2023-11-30 DIAGNOSIS — R91.8 MULTIPLE LUNG NODULES ON CT: Primary | ICD-10-CM

## 2023-11-30 DIAGNOSIS — R59.9 LYMPH NODES ENLARGED: ICD-10-CM

## 2023-11-30 DIAGNOSIS — R91.8 OPACITY OF LUNG ON IMAGING STUDY: ICD-10-CM

## 2023-11-30 PROCEDURE — 99212 OFFICE O/P EST SF 10 MIN: CPT | Performed by: NURSE PRACTITIONER

## 2023-11-30 RX ORDER — AMLODIPINE BESYLATE 10 MG/1
TABLET ORAL
COMMUNITY
Start: 2023-11-22

## 2023-11-30 ASSESSMENT — PATIENT HEALTH QUESTIONNAIRE - PHQ9
2. FEELING DOWN, DEPRESSED OR HOPELESS: NOT AT ALL
1. LITTLE INTEREST OR PLEASURE IN DOING THINGS: NOT AT ALL
SUM OF ALL RESPONSES TO PHQ9 QUESTIONS 1 AND 2: 0

## 2023-11-30 NOTE — PATIENT INSTRUCTIONS
Recommend CT chest and referral to Pulmonology   Patient will be notified of results as they become available.

## 2023-11-30 NOTE — PROGRESS NOTES
Subjective   Patient ID: Vladimir Forrester is a 58 y.o. male who presents for Follow-up (Touch base to encourage to schedule with pulmonary).  HPI58-year-old male presents today for lung nodule clinic.  Was a 1-2 cigarette smoker for 2 years. No personal history of cancer. Father had bone cancer.    Update: he was seen by Oncology - right supraclavicular lymph node on July 14, 2023 revealed findings consistent with lymphadenitis granuloma suggestive of sarcoidosis    Was given referral to Pulmonary Medicine.     PET/CT 5/16/2023  Foci of hypermetabolic tracer activity right supraclavicular region,  throughout the mediastinum, and right medial costophrenic region. The  intensity of these abnormalities as well as the multiple lymph node  chains, though nonspecific, raise concern for an active lymphomatous  process versus an active granulomatous process.    CT chest without contrast dated April 27, 2023  Overall improvement in aeration in the lungs with marked interval  improvement in basilar predominant opacities suggesting resolving  pneumonia. Examination does remain however abnormal with adenopathy  in the mediastinum as well as subpleural and Ele fissure tiny  nodules. No new nodules are seen. Pleural effusions have improved  without overt stigmata of pulmonary edema. Diagnostic considerations  include etiology such as sarcoidosis given the mediastinal  adenopathy. Lymphoma is felt to be unlikely. PET-CT may be of  diagnostic benefit to evaluate for metabolic activity within the  lymph nodes. Correlation with PFTs is also advised as well as  pulmonary consultation.  CT chest dated March 15, 2023  Large area of consolidation/alveolar opacities in the right lower  lobe, patchy areas of consolidation, infiltrates in the right middle  lobe and left lower lobe. Patchy ground-glass opacities noted  adjacent to the consolidation in the right middle lobe, right lower  lobe and the left lower lobe. Few interval development of  nodules in  the right and left lung, for example a 5.7 mm slightly spiculated  nodule along the right minor fissure/right middle lobe on slice  176/355, some nodules are increased from previous study, example 8.2  mm nodule in the left upper lobe, measures up to 7.6 mm in the  previous study, some of the new nodules in the right and left lung  are more appreciable, related to the interval decrease in the pleural  effusions. Multiple new patchy areas of nodular/alveolar opacities in  the right and left lung, especially in the upper lobes.  Multiple enlarged prevascular, paratracheal, subcarinal, bilateral  hilar lymph nodes, these appear more conspicuous from prior, however  lack of intravenous contrast study limits evaluation, for example a  precarinal lymph node measures at least 2.2 x 2.2 cm, was previously  measuring 1.7 x 1.9 cm. Few lymph nodes also noted in the right  anterior cardiophrenic region, for example the largest measures at  least 19 x 6 mm, was previously measuring 13 x 5 mm.     IMPRESSION:  1. Worsening lymphadenopathy, multiple nodules/nodular  opacities/alveolar opacities in the right and left lung, some of  these are new from previous study, some are increased from previous  study, could be related to worsening infectious/neoplastic process.  Large consolidation in the right lower lobe, patchy areas of  consolidation in the left lower lobe and in the right middle lobe,  concerning for multifocal pneumonia/aspiration.    CT chest dated March 15, 2023  Large area of consolidation/alveolar opacities in the right lower  lobe, patchy areas of consolidation, infiltrates in the right middle  lobe and left lower lobe. Patchy ground-glass opacities noted  adjacent to the consolidation in the right middle lobe, right lower  lobe and the left lower lobe. Few interval development of nodules in  the right and left lung, for example a 5.7 mm slightly spiculated  nodule along the right minor fissure/right  middle lobe on slice  176/355, some nodules are increased from previous study, example 8.2  mm nodule in the left upper lobe, measures up to 7.6 mm in the  previous study, some of the new nodules in the right and left lung  are more appreciable, related to the interval decrease in the pleural  effusions. Multiple new patchy areas of nodular/alveolar opacities in  the right and left lung, especially in the upper lobes.  Multiple enlarged prevascular, paratracheal, subcarinal, bilateral  hilar lymph nodes, these appear more conspicuous from prior, however  lack of intravenous contrast study limits evaluation, for example a  precarinal lymph node measures at least 2.2 x 2.2 cm, was previously  measuring 1.7 x 1.9 cm. Few lymph nodes also noted in the right  anterior cardiophrenic region, for example the largest measures at  least 19 x 6 mm, was previously measuring 13 x 5 mm.     IMPRESSION:  1. Worsening lymphadenopathy, multiple nodules/nodular  opacities/alveolar opacities in the right and left lung, some of  these are new from previous study, some are increased from previous  study, could be related to worsening infectious/neoplastic process.  Large consolidation in the right lower lobe, patchy areas of  consolidation in the left lower lobe and in the right middle lobe,  concerning for multifocal pneumonia/aspiration.    Review of Systems  Review of systems: Present-feeling well. Not present-chills, fatigue and fever.  Respiratory: Not present-difficulty breathing, cough, bloody sputum.  Cardiovascular: Not present-chest pain, palpitations, dyspnea on exertion.  Objective   There were no vitals taken for this visit.   Assessment/Plan   Diagnoses and all orders for this visit:  Multiple lung nodules on CT  Opacity of lung on imaging study  Lymph nodes enlarged      Recommend CT chest and Follow up with Pulmonology

## 2023-12-01 ENCOUNTER — APPOINTMENT (OUTPATIENT)
Dept: GASTROENTEROLOGY | Facility: CLINIC | Age: 58
End: 2023-12-01
Payer: COMMERCIAL

## 2023-12-06 ENCOUNTER — PATIENT OUTREACH (OUTPATIENT)
Dept: PRIMARY CARE | Facility: CLINIC | Age: 58
End: 2023-12-06
Payer: COMMERCIAL

## 2023-12-07 ENCOUNTER — PATIENT OUTREACH (OUTPATIENT)
Dept: PRIMARY CARE | Facility: CLINIC | Age: 58
End: 2023-12-07
Payer: COMMERCIAL

## 2023-12-08 ENCOUNTER — TELEPHONE (OUTPATIENT)
Dept: PULMONOLOGY | Facility: HOSPITAL | Age: 58
End: 2023-12-08
Payer: COMMERCIAL

## 2023-12-12 ENCOUNTER — OFFICE VISIT (OUTPATIENT)
Dept: UROLOGY | Facility: CLINIC | Age: 58
End: 2023-12-12
Payer: COMMERCIAL

## 2023-12-12 VITALS
HEART RATE: 79 BPM | DIASTOLIC BLOOD PRESSURE: 78 MMHG | TEMPERATURE: 99.2 F | WEIGHT: 175 LBS | SYSTOLIC BLOOD PRESSURE: 113 MMHG | BODY MASS INDEX: 27.47 KG/M2 | HEIGHT: 67 IN

## 2023-12-12 DIAGNOSIS — R31.9 HEMATURIA, UNSPECIFIED TYPE: Primary | ICD-10-CM

## 2023-12-12 DIAGNOSIS — N40.1 BENIGN PROSTATIC HYPERPLASIA WITH LOWER URINARY TRACT SYMPTOMS, SYMPTOM DETAILS UNSPECIFIED: ICD-10-CM

## 2023-12-12 LAB
POC APPEARANCE, URINE: ABNORMAL
POC BILIRUBIN, URINE: NEGATIVE
POC BLOOD, URINE: ABNORMAL
POC COLOR, URINE: YELLOW
POC GLUCOSE, URINE: ABNORMAL MG/DL
POC KETONES, URINE: NEGATIVE MG/DL
POC LEUKOCYTES, URINE: NEGATIVE
POC NITRITE,URINE: NEGATIVE
POC PH, URINE: 6 PH
POC PROTEIN, URINE: ABNORMAL MG/DL
POC SPECIFIC GRAVITY, URINE: 1.02
POC UROBILINOGEN, URINE: 0.2 EU/DL

## 2023-12-12 PROCEDURE — 88112 CYTOPATH CELL ENHANCE TECH: CPT

## 2023-12-12 PROCEDURE — 1036F TOBACCO NON-USER: CPT | Performed by: NURSE PRACTITIONER

## 2023-12-12 PROCEDURE — 87086 URINE CULTURE/COLONY COUNT: CPT

## 2023-12-12 PROCEDURE — 3078F DIAST BP <80 MM HG: CPT | Performed by: NURSE PRACTITIONER

## 2023-12-12 PROCEDURE — 99204 OFFICE O/P NEW MOD 45 MIN: CPT | Performed by: NURSE PRACTITIONER

## 2023-12-12 PROCEDURE — 81003 URINALYSIS AUTO W/O SCOPE: CPT | Performed by: NURSE PRACTITIONER

## 2023-12-12 PROCEDURE — 88112 CYTOPATH CELL ENHANCE TECH: CPT | Performed by: PATHOLOGY

## 2023-12-12 PROCEDURE — 3074F SYST BP LT 130 MM HG: CPT | Performed by: NURSE PRACTITIONER

## 2023-12-12 PROCEDURE — 3008F BODY MASS INDEX DOCD: CPT | Performed by: NURSE PRACTITIONER

## 2023-12-12 NOTE — LETTER
December 12, 2023     Patient: Vladimir Forrester   YOB: 1965   Date of Visit: 12/12/2023       To Whom It May Concern:    Vladimir Forrester was seen in my clinic on 12/12/2023 at 2:40 pm. Please excuse Vladimir for his absence from work on this day to make the appointment.    If you have any questions or concerns, please don't hesitate to call.         Sincerely,         QUINTIN Arce-CNP        CC: No Recipients

## 2023-12-12 NOTE — PATIENT INSTRUCTIONS
Creatinine > 3 ; CT abd/pelv without contrast call to schedule [phone] (order in system)  Cystoscopy in office, urine culture 2 weeks prior (drop off urine at lab, order in system)  Nurse line [phone]

## 2023-12-12 NOTE — PROGRESS NOTES
Hematuria    12/12/23   [id number]    Microscopic Hematuria     Subjective      HPI Vladimir Forrester is a 58 y.o. male who presents for microscopic hematuria. Kindly referred by Dr. Hawley; per patient never gross hematuria, no back pain; on Eliquis, significant recent cardiac history; hx kidney stones years ago, never had procedure for stones;     11/2/23 Creatinine 3.03 GFR 23 (range creatinine 2.28-2.85)    10/23/23 urine culture no growth  10/23/23 urine microscopy wbc 21-50/hpf, rbc > 20/hpf; UA large heme, neg leuk;    PMH CAD, HLD, HTN, CHF, Hypothyroid, CKD, s/p left heart cath, coronary angiogram and PCI for NSTEMI, distal RCA culprit, 90% ISR in October 2023;     YSABEL 8/22/23 unremarkable no hydronephrosis, nonobstructive 0.4 cm left lower group calyx renal nephrolithiasis, prostatomegaly 39 ml; bladder partially decompressed;     UA large heme today;  Quit smoking age 20, social smoker  Works in [employer], worked around chemicals for 10 years;   No urological or prostate cancer in family;     No urinary or frequency except when takes diuretic, stream is ok, NTF 3 x; maybe a little leakage;     Objective     There were no vitals taken for this visit.   Physical Exam  General: Appears comfortable and in no apparent distress, well nourished  Head: Normocephalic, atraumatic  Neck: trachea midline  Respiratory: respirations unlabored, no wheezes, and no use of accessory muscles  Cardiovascular: at rest no dyspnea, well perfused  Skin: no visible rashes or lesions  Neurologic: grossly intact, oriented to person, place, and time  Psychiatric: mood and affect appropriate  Musculoskeletal: in chair for appt. no difficulty w upper body movement    : refused      Assessment/Plan   Problem List Items Addressed This Visit    None    No orders of the defined types were placed in this encounter.     Creatinine > 3 ; CT abd/pelv without contrast call to schedule [phone] (order in system)  Cystoscopy in  office, urine culture 2 weeks prior (drop off urine at lab, order in system)  Nurse line [phone]       Subha Reed, QUINTIN-CNP  Lab Results   Component Value Date    GLUCOSE 89 11/02/2023    CALCIUM 13.7 (HH) 11/02/2023     11/02/2023    K 3.8 11/02/2023    CO2 30 11/02/2023     11/02/2023    BUN 41 (H) 11/02/2023    CREATININE 3.03 (H) 11/02/2023

## 2023-12-13 LAB — BACTERIA UR CULT: NORMAL

## 2023-12-14 ENCOUNTER — HOSPITAL ENCOUNTER (EMERGENCY)
Facility: HOSPITAL | Age: 58
Discharge: HOME | End: 2023-12-14
Payer: COMMERCIAL

## 2023-12-14 ENCOUNTER — APPOINTMENT (OUTPATIENT)
Dept: CARDIOLOGY | Facility: HOSPITAL | Age: 58
End: 2023-12-14
Payer: COMMERCIAL

## 2023-12-14 VITALS
HEART RATE: 81 BPM | HEIGHT: 68 IN | BODY MASS INDEX: 25.73 KG/M2 | DIASTOLIC BLOOD PRESSURE: 87 MMHG | OXYGEN SATURATION: 98 % | RESPIRATION RATE: 16 BRPM | SYSTOLIC BLOOD PRESSURE: 137 MMHG | WEIGHT: 169.75 LBS | TEMPERATURE: 98.6 F

## 2023-12-14 LAB
LABORATORY COMMENT REPORT: NORMAL
LABORATORY COMMENT REPORT: NORMAL
PATH REPORT.FINAL DX SPEC: NORMAL
PATH REPORT.GROSS SPEC: NORMAL
PATH REPORT.RELEVANT HX SPEC: NORMAL
PATH REPORT.TOTAL CANCER: NORMAL

## 2023-12-14 PROCEDURE — 4500999001 HC ED NO CHARGE

## 2023-12-14 PROCEDURE — 93005 ELECTROCARDIOGRAM TRACING: CPT

## 2023-12-14 PROCEDURE — 99283 EMERGENCY DEPT VISIT LOW MDM: CPT

## 2023-12-14 ASSESSMENT — PAIN SCALES - GENERAL: PAINLEVEL_OUTOF10: 0 - NO PAIN

## 2023-12-14 ASSESSMENT — COLUMBIA-SUICIDE SEVERITY RATING SCALE - C-SSRS
2. HAVE YOU ACTUALLY HAD ANY THOUGHTS OF KILLING YOURSELF?: NO
6. HAVE YOU EVER DONE ANYTHING, STARTED TO DO ANYTHING, OR PREPARED TO DO ANYTHING TO END YOUR LIFE?: NO
1. IN THE PAST MONTH, HAVE YOU WISHED YOU WERE DEAD OR WISHED YOU COULD GO TO SLEEP AND NOT WAKE UP?: NO

## 2023-12-14 ASSESSMENT — PAIN - FUNCTIONAL ASSESSMENT: PAIN_FUNCTIONAL_ASSESSMENT: 0-10

## 2023-12-15 ENCOUNTER — OFFICE VISIT (OUTPATIENT)
Dept: PRIMARY CARE | Facility: CLINIC | Age: 58
End: 2023-12-15
Payer: COMMERCIAL

## 2023-12-15 ENCOUNTER — HOSPITAL ENCOUNTER (OUTPATIENT)
Dept: RADIOLOGY | Facility: HOSPITAL | Age: 58
Discharge: HOME | End: 2023-12-15
Payer: COMMERCIAL

## 2023-12-15 VITALS
WEIGHT: 170.2 LBS | SYSTOLIC BLOOD PRESSURE: 130 MMHG | DIASTOLIC BLOOD PRESSURE: 80 MMHG | BODY MASS INDEX: 25.88 KG/M2 | OXYGEN SATURATION: 99 % | TEMPERATURE: 97.5 F | HEART RATE: 79 BPM

## 2023-12-15 DIAGNOSIS — R59.9 LYMPH NODES ENLARGED: ICD-10-CM

## 2023-12-15 DIAGNOSIS — R91.8 MULTIPLE LUNG NODULES ON CT: ICD-10-CM

## 2023-12-15 DIAGNOSIS — R91.8 OPACITY OF LUNG ON IMAGING STUDY: ICD-10-CM

## 2023-12-15 DIAGNOSIS — R42 DIZZY: Primary | ICD-10-CM

## 2023-12-15 LAB
ANION GAP SERPL CALC-SCNC: 13 MMOL/L (ref 10–20)
BUN SERPL-MCNC: 33 MG/DL (ref 6–23)
CALCIUM SERPL-MCNC: 10.3 MG/DL (ref 8.6–10.6)
CHLORIDE SERPL-SCNC: 106 MMOL/L (ref 98–107)
CO2 SERPL-SCNC: 26 MMOL/L (ref 21–32)
CREAT SERPL-MCNC: 2.41 MG/DL (ref 0.5–1.3)
GFR SERPL CREATININE-BSD FRML MDRD: 30 ML/MIN/1.73M*2
GLUCOSE SERPL-MCNC: 85 MG/DL (ref 74–99)
POTASSIUM SERPL-SCNC: 3.6 MMOL/L (ref 3.5–5.3)
SODIUM SERPL-SCNC: 141 MMOL/L (ref 136–145)

## 2023-12-15 PROCEDURE — 80048 BASIC METABOLIC PNL TOTAL CA: CPT

## 2023-12-15 PROCEDURE — 93000 ELECTROCARDIOGRAM COMPLETE: CPT | Performed by: NURSE PRACTITIONER

## 2023-12-15 PROCEDURE — 99214 OFFICE O/P EST MOD 30 MIN: CPT | Performed by: NURSE PRACTITIONER

## 2023-12-15 PROCEDURE — 3008F BODY MASS INDEX DOCD: CPT | Performed by: NURSE PRACTITIONER

## 2023-12-15 PROCEDURE — 3075F SYST BP GE 130 - 139MM HG: CPT | Performed by: NURSE PRACTITIONER

## 2023-12-15 PROCEDURE — 1036F TOBACCO NON-USER: CPT | Performed by: NURSE PRACTITIONER

## 2023-12-15 PROCEDURE — 71250 CT THORAX DX C-: CPT

## 2023-12-15 PROCEDURE — 36415 COLL VENOUS BLD VENIPUNCTURE: CPT

## 2023-12-15 PROCEDURE — 71250 CT THORAX DX C-: CPT | Performed by: RADIOLOGY

## 2023-12-15 PROCEDURE — 3079F DIAST BP 80-89 MM HG: CPT | Performed by: NURSE PRACTITIONER

## 2023-12-15 ASSESSMENT — ENCOUNTER SYMPTOMS
NUMBNESS: 0
RESPIRATORY NEGATIVE: 1
CONSTITUTIONAL NEGATIVE: 1
SPEECH DIFFICULTY: 0
CARDIOVASCULAR NEGATIVE: 1
WEAKNESS: 0
HEADACHES: 0
EYES NEGATIVE: 1
DIZZINESS: 1

## 2023-12-15 ASSESSMENT — PAIN SCALES - GENERAL: PAINLEVEL: 0-NO PAIN

## 2023-12-15 NOTE — PROGRESS NOTES
Subjective   Patient ID: Vladimir Forrester is a 58 y.o. male who presents for Dizziness.    HPI   Established 59 y/o male with PMH CAD s/p PCI/FRENCH to RCA, PAF, HTN, HLD, CHF, Hypothyroid, Lung nodules who presents with c/o dizziness. He is accompanied by his wife  Reports dizziness, fleeting that started 2 days ago. Feels unsteady, no spinning, no fall. Happens when laying down or when standing, does not know what makes it better or worse. Denies ear pain, tinnitus, headache, chest pain or palpitation, denies sob. No cough or cold type symptoms. Thought Buspirone or gabapentin was causing symptoms so he stopped taking on Monday with no changes  Presented to the ED with same c/o yesterday but left after 2 hours  EKG today unchanged from previous    Review of Systems   Constitutional: Negative.    Eyes: Negative.    Respiratory: Negative.     Cardiovascular: Negative.    Neurological:  Positive for dizziness. Negative for syncope, speech difficulty, weakness, numbness and headaches.   All other systems reviewed and are negative.      Objective   /80 (BP Location: Left arm, Patient Position: Sitting, BP Cuff Size: Large adult)   Pulse 79   Temp 36.4 °C (97.5 °F) (Temporal)   Wt 77.2 kg (170 lb 3.2 oz)   SpO2 99%   BMI 25.88 kg/m²     Physical Exam  Vitals reviewed.   Constitutional:       Appearance: Normal appearance.   HENT:      Head: Normocephalic.      Right Ear: Tympanic membrane and ear canal normal.      Left Ear: Tympanic membrane and ear canal normal.      Nose: Congestion present.      Mouth/Throat:      Mouth: Mucous membranes are dry.   Eyes:      Extraocular Movements: Extraocular movements intact.      Conjunctiva/sclera: Conjunctivae normal.      Pupils: Pupils are equal, round, and reactive to light.   Cardiovascular:      Rate and Rhythm: Normal rate and regular rhythm.      Pulses: Normal pulses.   Pulmonary:      Effort: Pulmonary effort is normal.      Breath sounds: Normal breath sounds.    Musculoskeletal:         General: Normal range of motion.   Neurological:      Mental Status: He is alert.   Psychiatric:         Mood and Affect: Mood normal.           Assessment/Plan   Problem List Items Addressed This Visit    None  Visit Diagnoses         Codes    Dizzy    -  Primary R42    Relevant Orders    Basic metabolic panel    ECG 12 lead (Clinic Performed) (Completed)

## 2023-12-15 NOTE — PATIENT INSTRUCTIONS
Take medications with food    Increase fluids throughout the day, Gatorade, water    If you experience chest pain, shortness of breath return to the ED    Common Cold  Usually starts 3-5 days after exposure and lasts about 10 days with day 3-5 the worst.   The cough may linger a little longer  Symptoms include Sneezing, Stuffy or Runny Nose, Sore Throat, Cough from post nasal drip, Watery Eyes, Sometimes fever    Treatment  Tylenol for aches and pains, fever  Children under 3 months should not take Tylenol, under 6 months should not take Ibuprofen or if dehydrated  Warm salt water gargles for throat discomfort  Lots of Fluids such as tea with honey, soup, broth, water, Electrolyte replacement drinks  Rest      Preventing Infection    Wash your hands. Wash your hands thoroughly and often with soap and water for at least 20 seconds. If soap and water aren't available, use an alcohol-based hand  that contains at least 60% alcohol. Teach your children the importance of hand-washing. Avoid touching your eyes, nose or mouth with unwashed hands.    Disinfect your stuff. Clean and disinfect high-touch surfaces, such as doorknobs, light switches, electronics, and kitchen and bathroom countertops daily. This is especially important when someone in your family has a cold. Wash children's toys periodically.    Cover your cough. Sneeze and cough into tissues. Throw away used tissues right away, then wash your hands thoroughly. If you don't have a tissue, sneeze or cough into the bend of your elbow and then wash your hands.    Don't share. Don't share drinking glasses or eating utensils with other family members. Use your own glass or disposable cups when you or someone else is sick. Label the cup or glass with the name of the person using it.    Stay away from people with colds. Avoid close contact with anyone who has a cold. Stay out of crowds, when possible. Avoid touching your eyes, nose and mouth.  Review your child  care center's policies. Look for a  setting with good hygiene practices and clear policies about keeping sick children at home.    Take care of yourself. Eating well and getting exercise and enough sleep is good for your overall health.

## 2023-12-15 NOTE — LETTER
December 15, 2023     Patient: Vladimir Forrester   YOB: 1965   Date of Visit: 12/15/2023       To Whom It May Concern:    Vladimir Forrester was seen in my clinic on 12/15/2023 at 10:00 am. Please excuse Vladimir for his absence from work on this day to make the appointment.    If you have any questions or concerns, please don't hesitate to call.         Sincerely,         Criselda Hawley, APRN-CNP        CC: No Recipients

## 2023-12-19 ENCOUNTER — TELEPHONE (OUTPATIENT)
Dept: PRIMARY CARE | Facility: CLINIC | Age: 58
End: 2023-12-19
Payer: COMMERCIAL

## 2023-12-19 ENCOUNTER — HOSPITAL ENCOUNTER (OUTPATIENT)
Dept: CARDIOLOGY | Facility: HOSPITAL | Age: 58
Discharge: HOME | End: 2023-12-19
Payer: COMMERCIAL

## 2023-12-19 ENCOUNTER — APPOINTMENT (OUTPATIENT)
Dept: PRIMARY CARE | Facility: CLINIC | Age: 58
End: 2023-12-19
Payer: COMMERCIAL

## 2023-12-19 LAB
ATRIAL RATE: 65 BPM
P AXIS: 76 DEGREES
P OFFSET: 74 MS
P ONSET: 17 MS
PR INTERVAL: 408 MS
Q ONSET: 221 MS
QRS COUNT: 10 BEATS
QRS DURATION: 126 MS
QT INTERVAL: 484 MS
QTC CALCULATION(BAZETT): 503 MS
QTC FREDERICIA: 496 MS
R AXIS: 35 DEGREES
T AXIS: 75 DEGREES
T OFFSET: 463 MS
VENTRICULAR RATE: 65 BPM

## 2023-12-19 PROCEDURE — 93005 ELECTROCARDIOGRAM TRACING: CPT

## 2023-12-19 NOTE — TELEPHONE ENCOUNTER
Spoke with patient and his wife re: CT Chest     There are innumerable scattered nodules and reticular opacities  throughout the lungs, progressed when compared to the previous study.  The nodules are increased in size and number when compared to the  previous exam, as are the reticular opacities. The findings are  nonspecific but may relate to worsening infectious or inflammatory  process, or possibly lymphangitic spread of tumor. No new dense areas  of consolidation. No effusion. No pneumothorax.    Patient is having difficulty getting in with pulmonary.   I did refer patient to ER for what appears may be worsening infection in the lungs.   Patient with dizziness and lightheadedness.   They will be reaching out to his pcp and follow up in ER with worsening condition.      They do have referral for pulmonary medicine.

## 2023-12-21 ENCOUNTER — APPOINTMENT (OUTPATIENT)
Dept: PRIMARY CARE | Facility: CLINIC | Age: 58
End: 2023-12-21
Payer: COMMERCIAL

## 2023-12-26 ENCOUNTER — TELEPHONE (OUTPATIENT)
Dept: PRIMARY CARE | Facility: CLINIC | Age: 58
End: 2023-12-26
Payer: COMMERCIAL

## 2024-01-04 ENCOUNTER — APPOINTMENT (OUTPATIENT)
Dept: PRIMARY CARE | Facility: CLINIC | Age: 59
End: 2024-01-04
Payer: COMMERCIAL

## 2024-01-09 ENCOUNTER — APPOINTMENT (OUTPATIENT)
Dept: NEPHROLOGY | Facility: CLINIC | Age: 59
End: 2024-01-09
Payer: COMMERCIAL

## 2024-01-25 ENCOUNTER — OFFICE VISIT (OUTPATIENT)
Dept: PRIMARY CARE | Facility: CLINIC | Age: 59
End: 2024-01-25
Payer: COMMERCIAL

## 2024-01-25 VITALS
BODY MASS INDEX: 26.15 KG/M2 | OXYGEN SATURATION: 99 % | SYSTOLIC BLOOD PRESSURE: 138 MMHG | WEIGHT: 172 LBS | TEMPERATURE: 97.8 F | DIASTOLIC BLOOD PRESSURE: 80 MMHG | HEART RATE: 83 BPM

## 2024-01-25 DIAGNOSIS — I48.20 CHRONIC ATRIAL FIBRILLATION (MULTI): ICD-10-CM

## 2024-01-25 DIAGNOSIS — R91.8 MULTIPLE LUNG NODULES ON CT: ICD-10-CM

## 2024-01-25 DIAGNOSIS — M54.32 SCIATICA OF LEFT SIDE: Primary | ICD-10-CM

## 2024-01-25 DIAGNOSIS — N18.4 CKD (CHRONIC KIDNEY DISEASE), STAGE IV (MULTI): ICD-10-CM

## 2024-01-25 PROCEDURE — 1036F TOBACCO NON-USER: CPT | Performed by: NURSE PRACTITIONER

## 2024-01-25 PROCEDURE — 99213 OFFICE O/P EST LOW 20 MIN: CPT | Performed by: NURSE PRACTITIONER

## 2024-01-25 PROCEDURE — 3008F BODY MASS INDEX DOCD: CPT | Performed by: NURSE PRACTITIONER

## 2024-01-25 PROCEDURE — 3075F SYST BP GE 130 - 139MM HG: CPT | Performed by: NURSE PRACTITIONER

## 2024-01-25 PROCEDURE — 3079F DIAST BP 80-89 MM HG: CPT | Performed by: NURSE PRACTITIONER

## 2024-01-25 RX ORDER — GABAPENTIN 100 MG/1
300 CAPSULE ORAL NIGHTLY
Qty: 90 CAPSULE | Refills: 11 | Status: SHIPPED | OUTPATIENT
Start: 2024-01-25 | End: 2024-04-16 | Stop reason: WASHOUT

## 2024-01-25 RX ORDER — CYCLOBENZAPRINE HCL 10 MG
10 TABLET ORAL NIGHTLY PRN
Qty: 7 TABLET | Refills: 0 | Status: SHIPPED | OUTPATIENT
Start: 2024-01-25 | End: 2024-02-13 | Stop reason: SDUPTHER

## 2024-01-25 ASSESSMENT — ENCOUNTER SYMPTOMS: DEPRESSION: 0

## 2024-01-25 ASSESSMENT — PAIN SCALES - GENERAL: PAINLEVEL: 4

## 2024-01-25 NOTE — PROGRESS NOTES
Subjective   Patient ID: Vladimir Forrester is a 58 y.o. male who presents for Follow-up and Leg Pain.    HPI   Pleasant established 57 y/o male with PMH CKD IV, Afib, HLD, HTN, Cardiomyopathy, Combined CHF, Hypothyroid, Lung nodules presents for follow up  At last visit reported dizziness which today he tells me is mostly resolved, notes when he gets up quickly, no associated chest pain, sob, palpitation, has not fallen. Presented to the ED with same symptoms 12/14/2023 with unchanged EKG    Today with c/o left leg pain, intermittent throughout the day. Pain in hip that radiates to his knee, has trouble describing, denies shock type, endorses ache. Has gabapentin which he takes at night and finds it provides relief, does not take during the day as he feels it may make him drowsy.     Pulmonary nodules-Repeat CT chest 12/15/2023  IMPRESSION:  Innumerable nodules and reticular opacities scattered throughout the  lungs, progressed when compared to the previous study. The findings  are nonspecific and may relate to worsening infectious or  inflammatory process. However, lymphangitic spread of tumor is also a  possibility. Hilar mediastinal lymphadenopathy which is stable.    Results reported to patient and spouse by Dr Cuba  Has not followed up with Pulmonology or Nephrology as he states offices are too far away, [place]. New referrals placed, will attempt to get him in closer to home and in to Pulmonology sooner given noted changes on CT. D/W joshua cook agreeable. He is off work first week of February, encouraged to not miss scheduled appointments, emphasized  Currently denies chest pain, sob, no cough or briggs, endorses intermittent dizziness    Review of Systems  Review of Systems   Constitutional: HPI  Respiratory: Negative.     Cardiovascular: Negative.    Gastrointestinal: Negative.    Musculoskeletal: HPI   All other systems reviewed and are negative.    Objective   /80 (BP Location: Left  arm, Patient Position: Sitting)   Pulse 83   Temp 36.6 °C (97.8 °F)   Wt 78 kg (172 lb)   SpO2 99%   BMI 26.15 kg/m²     Physical Exam  Vitals reviewed.   Constitutional:       Appearance: Normal appearance.   Cardiovascular:      Rate and Rhythm: Normal rate and regular rhythm.   Pulmonary:      Effort: Pulmonary effort is normal.      Breath sounds: Normal breath sounds. No wheezing or rhonchi.   Musculoskeletal:         General: Tenderness present.      Cervical back: Neck supple.   Neurological:      General: No focal deficit present.      Mental Status: He is alert.   Psychiatric:         Mood and Affect: Mood normal.         Assessment/Plan   Problem List Items Addressed This Visit             ICD-10-CM    Multiple lung nodules on CT R91.8    Relevant Orders    Referral to Pulmonology    Chronic atrial fibrillation (CMS/HCC) I48.20    CKD (chronic kidney disease), stage IV (CMS/HCC) N18.4    Relevant Orders    Referral to Nephrology    Sciatica of left side - Primary M54.32    Relevant Medications    gabapentin (Neurontin) 100 mg capsule    cyclobenzaprine (Flexeril) 10 mg tablet

## 2024-01-25 NOTE — PATIENT INSTRUCTIONS
A prescription was sent to your pharmacy. Your pharmacist can answer any questions or concerns you may have or you may call the office.    Gabapentin 300 mg at night

## 2024-01-29 PROBLEM — R93.89 ABNORMAL COMPUTERIZED AXIAL TOMOGRAPHY OF CHEST: Status: ACTIVE | Noted: 2024-01-29

## 2024-01-29 PROBLEM — I48.92 ATRIAL FLUTTER (MULTI): Status: RESOLVED | Noted: 2023-03-20 | Resolved: 2024-01-29

## 2024-01-29 PROBLEM — I50.20 HFREF (HEART FAILURE WITH REDUCED EJECTION FRACTION) (MULTI): Status: ACTIVE | Noted: 2023-05-18

## 2024-01-29 PROBLEM — Z86.79 HISTORY OF ATRIAL FLUTTER: Status: RESOLVED | Noted: 2024-01-29 | Resolved: 2024-01-29

## 2024-01-29 PROBLEM — I48.0 PAROXYSMAL ATRIAL FIBRILLATION (MULTI): Status: ACTIVE | Noted: 2024-01-29

## 2024-01-29 PROBLEM — Z86.79 HISTORY OF ATRIAL FLUTTER: Status: ACTIVE | Noted: 2024-01-29

## 2024-01-29 PROBLEM — N40.0 BENIGN PROSTATIC HYPERPLASIA: Status: ACTIVE | Noted: 2024-01-29

## 2024-02-05 ENCOUNTER — OFFICE VISIT (OUTPATIENT)
Dept: CARDIOLOGY | Facility: CLINIC | Age: 59
End: 2024-02-05
Payer: COMMERCIAL

## 2024-02-05 VITALS
BODY MASS INDEX: 25.01 KG/M2 | OXYGEN SATURATION: 97 % | HEART RATE: 78 BPM | SYSTOLIC BLOOD PRESSURE: 136 MMHG | WEIGHT: 165 LBS | HEIGHT: 68 IN | DIASTOLIC BLOOD PRESSURE: 80 MMHG

## 2024-02-05 DIAGNOSIS — I25.10 CORONARY ARTERY DISEASE INVOLVING NATIVE CORONARY ARTERY OF NATIVE HEART WITHOUT ANGINA PECTORIS: ICD-10-CM

## 2024-02-05 DIAGNOSIS — I10 PRIMARY HYPERTENSION: ICD-10-CM

## 2024-02-05 DIAGNOSIS — I50.20 HFREF (HEART FAILURE WITH REDUCED EJECTION FRACTION) (MULTI): ICD-10-CM

## 2024-02-05 DIAGNOSIS — E78.49 OTHER HYPERLIPIDEMIA: ICD-10-CM

## 2024-02-05 DIAGNOSIS — I48.20 CHRONIC ATRIAL FIBRILLATION (MULTI): ICD-10-CM

## 2024-02-05 DIAGNOSIS — I48.0 PAROXYSMAL ATRIAL FIBRILLATION (MULTI): Primary | ICD-10-CM

## 2024-02-05 DIAGNOSIS — Z95.5 STATUS POST INSERTION OF DRUG-ELUTING STENT INTO LEFT ANTERIOR DESCENDING ARTERY FOR CORONARY ARTERY DISEASE: ICD-10-CM

## 2024-02-05 DIAGNOSIS — I25.5 ISCHEMIC CARDIOMYOPATHY: ICD-10-CM

## 2024-02-05 PROBLEM — D86.9 SARCOIDOSIS: Status: ACTIVE | Noted: 2024-02-05

## 2024-02-05 PROCEDURE — 3075F SYST BP GE 130 - 139MM HG: CPT | Performed by: STUDENT IN AN ORGANIZED HEALTH CARE EDUCATION/TRAINING PROGRAM

## 2024-02-05 PROCEDURE — 99214 OFFICE O/P EST MOD 30 MIN: CPT | Performed by: STUDENT IN AN ORGANIZED HEALTH CARE EDUCATION/TRAINING PROGRAM

## 2024-02-05 PROCEDURE — 3008F BODY MASS INDEX DOCD: CPT | Performed by: STUDENT IN AN ORGANIZED HEALTH CARE EDUCATION/TRAINING PROGRAM

## 2024-02-05 PROCEDURE — 3079F DIAST BP 80-89 MM HG: CPT | Performed by: STUDENT IN AN ORGANIZED HEALTH CARE EDUCATION/TRAINING PROGRAM

## 2024-02-05 PROCEDURE — 1036F TOBACCO NON-USER: CPT | Performed by: STUDENT IN AN ORGANIZED HEALTH CARE EDUCATION/TRAINING PROGRAM

## 2024-02-05 ASSESSMENT — PAIN SCALES - GENERAL: PAINLEVEL: 0-NO PAIN

## 2024-02-05 NOTE — PROGRESS NOTES
"Chief Complaint:   3 month f/u     History Of Present Illness:    Vladimir Forrester is a 58 y.o. male returns for follow-up appointment.  He has been stable since his last appoint.  Denies shortness of breath or chest discomfort lower extreme edema orthopnea or PND.  He is off of Entresto.  He will monitor his renal function closely due to uptrending creatinine.  Patient a recent CT chest which showed multiple nodules in the lungs which have increased as compared to previous assessment with stable mediastinal lymphadenopathy.  He is apparently supposed to follow with pulmonology.  He has had previous biopsy in the past which confirmed sarcoidosis.  Remains on amiodarone for atrial fibrillation.  He has not had subsequent events.     Last Recorded Vitals:  Vitals:    02/05/24 1104   BP: 136/80   BP Location: Left arm   Patient Position: Sitting   BP Cuff Size: Adult   Pulse: 78   SpO2: 97%   Weight: 74.8 kg (165 lb)   Height: 1.727 m (5' 8\")       Review of Systems  ROS      Allergies:  Other    Outpatient Medications:  Current Outpatient Medications   Medication Instructions    amiodarone (Pacerone) 200 mg tablet 1 tablet, oral, Daily    amLODIPine (Norvasc) 10 mg tablet     apixaban (Eliquis) 5 mg tablet 1 tablet, oral, 2 times daily    busPIRone (BUSPAR) 15 mg, oral, Nightly    clopidogrel (Plavix) 75 mg tablet 1 tablet, oral, Daily    cyclobenzaprine (FLEXERIL) 10 mg, oral, Nightly PRN    ergocalciferol (VITAMIN D-2) 1.25 mg, oral, Weekly    Farxiga 10 mg 1 tablet, oral, Daily before breakfast    gabapentin (NEURONTIN) 300 mg, oral, Nightly    levothyroxine (SYNTHROID, LEVOXYL) 25 mcg, oral, Daily    metoprolol succinate XL (Toprol-XL) 200 mg 24 hr tablet 1 tablet, oral, Daily    rosuvastatin (CRESTOR) 40 mg, oral, Daily       Physical Exam:  General: No acute distress,  A&O x3  Skin: Warm and dry  Neck: JVD is not elevated  ENT: Moist mucous membranes, poor oral MCC  Pulmonary: CTAB  Cards: Regular rate rhythm, " "no murmurs gallops or rubs appreciated normal S1-S2  Abdomen: Soft nontender nondistended  Extremities: No edema or cyanosis  Psych: Appropriate mood and affect        Last Labs:  CBC -  Lab Results   Component Value Date    WBC 5.6 10/17/2023    HGB 13.8 10/17/2023    HCT 40.5 (L) 10/17/2023    MCV 91 10/17/2023     10/17/2023       CMP -  Lab Results   Component Value Date    CALCIUM 10.3 12/15/2023    PHOS 4.5 08/22/2023    PROT 7.4 10/17/2023    ALBUMIN 3.6 10/17/2023    AST 24 10/17/2023    ALT 23 10/17/2023    ALKPHOS 66 10/17/2023    BILITOT 0.4 10/17/2023       LIPID PANEL -   Lab Results   Component Value Date    CHOL 100 07/01/2023    TRIG 66 07/01/2023    HDL 42.1 07/01/2023    CHHDL 2.4 07/01/2023    LDLF 45 07/01/2023    VLDL 13 07/01/2023       RENAL FUNCTION PANEL -   Lab Results   Component Value Date    GLUCOSE 85 12/15/2023     12/15/2023    K 3.6 12/15/2023     12/15/2023    CO2 26 12/15/2023    ANIONGAP 13 12/15/2023    BUN 33 (H) 12/15/2023    CREATININE 2.41 (H) 12/15/2023    GFRMALE 30 (A) 09/09/2023    CALCIUM 10.3 12/15/2023    PHOS 4.5 08/22/2023    ALBUMIN 3.6 10/17/2023        Lab Results   Component Value Date     (H) 07/20/2023    HGBA1C 5.5 02/14/2023       Last Cardiology Tests:  ECG:  Encounter Date: 12/15/23   ECG 12 lead (Clinic Performed)    Narrative    SR   1st degree AV block          Echo:  No results found for this or any previous visit from the past 1095 days.       Ejection Fractions:  No results found for: \"EF\"    Assessment and Plan    1. A. fib with RVR: Previous DANTE revealed the presence of a left atrial appendage thrombus measuring 1.45 x 0.74 cm. Status post DANTE showing resolution of thrombus and now cardioversion to sinus rhythm. Today he is in sinus rhythm.  Recurrence of atrial fibrillation after weaning off of amiodarone.  He is now on amiodarone 200 mg daily.  He remains on Eliquis for CVA prophylaxis.  Will refer to electrophysiology " for alternatives to amiodarone for atrial fibrillation management.     2. Heart failure reduced ejection fraction: EF of 20%. With recovery to ejection fraction of 50%. NYHA class III stage C. Recent hospitalization for heart failure diuresed with IV Lasix. Fairly euvolemic today. Etiology likely a combination of tachycardic induced cardiomyopathy and ischemia.   -Continue Entresto to 49/51 mg twice a day  -Continue metoprolol 200 mg daily  -Continue Lasix 40 mg daily     3. CAD: Mid LAD 70% and distal LAD 50% 70% mid first diagonal branch status post PCI and staged PCI to the RCA PLV branch. He is on Plavix and Eliquis.      4.  Previous PE: Suspected occlusive pulmonary emboli in the right lower lobe segmental pulm arterial branch. He is on anticoagulation for atrial fibrillation. Stable today. No RV strain by most recent echo.     5. Hypertension: At goal today with current medication regiment.     6. Hyperlipidemia: Well-controlled current dose of statin therapy.  Continue rosuvastatin 40 mg daily.     7.  Abnormal mediastinal lymphadenopathy with core biopsy of supraclavicular lymph lymphadenopathy with findings suggestive of sarcoidosis: Most recent cardiac MRI did not show evidence of cardiac involvement to date.    (This note was generated with voice recognition software and may contain errors including spelling, grammar, syntax and missed recognition of what was dictated, of which may not have been fully corrected)       Cody Vieyra MD PhD

## 2024-02-06 ENCOUNTER — APPOINTMENT (OUTPATIENT)
Dept: PODIATRY | Facility: CLINIC | Age: 59
End: 2024-02-06
Payer: COMMERCIAL

## 2024-02-07 ENCOUNTER — LAB (OUTPATIENT)
Dept: LAB | Facility: LAB | Age: 59
End: 2024-02-07
Payer: COMMERCIAL

## 2024-02-07 DIAGNOSIS — R31.9 HEMATURIA, UNSPECIFIED TYPE: ICD-10-CM

## 2024-02-07 LAB — PSA SERPL-MCNC: 0.35 NG/ML

## 2024-02-07 PROCEDURE — 84153 ASSAY OF PSA TOTAL: CPT

## 2024-02-07 PROCEDURE — 36415 COLL VENOUS BLD VENIPUNCTURE: CPT

## 2024-02-08 ENCOUNTER — TELEPHONE (OUTPATIENT)
Dept: UROLOGY | Facility: CLINIC | Age: 59
End: 2024-02-08
Payer: COMMERCIAL

## 2024-02-08 NOTE — TELEPHONE ENCOUNTER
----- Message from TAMANNA Arce sent at 2/8/2024  1:11 PM EST -----  Normal PSA, ty  ----- Message -----  From: Lab, Background User  Sent: 2/7/2024   7:05 PM EST  To: TAMANNA Arce

## 2024-02-12 DIAGNOSIS — M54.32 SCIATICA OF LEFT SIDE: ICD-10-CM

## 2024-02-12 RX ORDER — CYCLOBENZAPRINE HCL 10 MG
10 TABLET ORAL NIGHTLY PRN
Qty: 7 TABLET | Refills: 0 | OUTPATIENT
Start: 2024-02-12 | End: 2024-02-19

## 2024-02-13 DIAGNOSIS — M54.32 SCIATICA OF LEFT SIDE: ICD-10-CM

## 2024-02-14 RX ORDER — CYCLOBENZAPRINE HCL 10 MG
10 TABLET ORAL NIGHTLY PRN
Qty: 7 TABLET | Refills: 0 | Status: SHIPPED | OUTPATIENT
Start: 2024-02-14 | End: 2024-02-21

## 2024-02-23 DIAGNOSIS — I25.10 CORONARY ARTERY DISEASE INVOLVING NATIVE CORONARY ARTERY OF NATIVE HEART WITHOUT ANGINA PECTORIS: ICD-10-CM

## 2024-02-23 RX ORDER — ROSUVASTATIN CALCIUM 40 MG/1
40 TABLET, COATED ORAL DAILY
Qty: 90 TABLET | Refills: 3 | Status: SHIPPED | OUTPATIENT
Start: 2024-02-23 | End: 2025-02-22

## 2024-03-04 NOTE — PROGRESS NOTES
"    Cardiac Electrophysiology Office Visit     Referred by Dr. Vieyra, Cody VERDUGO MD* for   Chief Complaint   Patient presents with    New Patient Visit     Vladimir is here for a new patient consult      HPI:  Vladimir Forrester is a 58 y.o. year old male patient with h/o CAD s/p PCI (recently in Oct 2023), previous PE, HTN, HLD, prior HFrEF now recovered to 50%, atrial fibrillation (prior episodes of RVR with recurrence off amiodarone, history of STEPHANIE clot Sept 2022 now resolved on most recent DANTE Oct 2022) presenting today to establish care    Objective  Current Outpatient Medications   Medication Instructions    amLODIPine (Norvasc) 10 mg tablet     apixaban (Eliquis) 5 mg tablet 1 tablet, oral, 2 times daily    busPIRone (BUSPAR) 15 mg, oral, Nightly    clopidogrel (Plavix) 75 mg tablet 1 tablet, oral, Daily    cyclobenzaprine (FLEXERIL) 10 mg, oral, Nightly PRN    ergocalciferol (VITAMIN D-2) 1.25 mg, oral, Weekly    Farxiga 10 mg 1 tablet, oral, Daily before breakfast    gabapentin (NEURONTIN) 300 mg, oral, Nightly    levothyroxine (SYNTHROID, LEVOXYL) 25 mcg, oral, Daily    metoprolol succinate XL (Toprol-XL) 200 mg 24 hr tablet 1 tablet, oral, Daily    predniSONE (DELTASONE) 50 mg, oral, Daily    rosuvastatin (CRESTOR) 40 mg, oral, Daily    sulfamethoxazole-trimethoprim (Bactrim DS) 800-160 mg tablet 1 tablet, oral    valsartan (DIOVAN) 80 mg, oral, Daily         Visit Vitals  /82 (BP Location: Left arm, Patient Position: Sitting, BP Cuff Size: Adult)   Pulse 94   Ht 1.727 m (5' 8\")   Wt 84.4 kg (186 lb)   BMI 28.28 kg/m²   Smoking Status Former   BSA 2.01 m²      Physical Exam  Vitals reviewed.   Constitutional:       Appearance: Normal appearance.   HENT:      Head: Normocephalic.   Cardiovascular:      Rate and Rhythm: Normal rate and regular rhythm.   Pulmonary:      Effort: Pulmonary effort is normal. No respiratory distress.      Breath sounds: No wheezing.   Skin:     General: Skin is warm and dry.      " Capillary Refill: Capillary refill takes less than 2 seconds.   Neurological:      Mental Status: He is alert.   Psychiatric:         Mood and Affect: Mood normal.         My Interpretation of Reviewed Study(s):  Echo (October 2023): Normal left ventricular systolic function with an EF of 55%.  Left atrium is mildly enlarged with right atrium mildly dilated.  Normal right ventricular size and function.  Mild MR.  No pericardial effusion.  Cath (October 2023): 97% stenosis of the distal RCA secondary to severe in-stent restenosis.  Laser atherectomy with aspiration of the thrombectomy.  Balloon angioplasty with drug-coated balloon.  [id number] Score  Age <65: 0   Sex Male: 0   CHF History Yes: 1   HTN Yes: 1   Stroke/TIA/Thromboembolism Yes: 2   Vascular Dz: CAD/PAD/Aortic Plaque Yes: 1   DM No: 0   Total Score 5       Assessment/Plan   #Persistent atrial fibrillation  #Atypical Atrial flutter  AF Dx History: September 2022; h/o Cardioversion: Yes; AAD Use: Amiodarone 200mg (stopped due to avoidance of long term use); Anticoagulation use: Apixaban 5mg BID (current); h/o Ablation: NOne; [id number] Score: 5.  Patient has had atrial fibrillation and after prior cardioversion back in October 2022 was doing well was discontinued from amiodarone and had recurrence currently on amiodarone maintaining sinus rhythm based on recent ECGs from October.  Interestingly patient has a significant CA prolongation which likely is related to amiodarone however given patient's extracardiac lymphadenopathy highly concerning for sarcoidosis cardiac involvement could be suspected.  Prior cardiac MRI from October 2023 did not show any cardiac involvement and therefore as of now there is no definitive diagnosis.  Patient also has no tissue diagnosis of his lymphadenopathy and therefore it is unclear if this is true inflammatory autoimmune process or other etiology of lymphadenopathy.  In terms of rhythm control given patient's young age  antiarrhythmics versus ablation both would be reasonable.  Antiarrhythmic therapy with Tikosyn/sotalol could be considered however patient's last serum creatinine was 2.4 and would not be a candidate for class III medication.  Amiodarone as long-term rhythm control is not ideal given his young age and possible lung pathology.  At this point ablation seems like the only feasible option, but his prednisone use 50mg recently, his lung pathology all increase his alcides-procedural risk.    - c/w AC: Apixaban 5mg BID  - c/w Toprol XL 200mg daily  - Will need to evaluate the feasibility of performing ablation after pt has been off or on decreased dose of prednisone.  Will reevaluate after his upcoming visit with pulmonology.    Return to Clinic: Patient should return to the EP Clinic in 6-8 weeks    Long Hua MD [facility]  Cardiac Electrophysiology  [email or web address]    **Disclaimer: This note was dictated by speech recognition, and every effort has been made to prevent any error in transcription, however minor errors may be present**

## 2024-03-05 ENCOUNTER — OFFICE VISIT (OUTPATIENT)
Dept: CARDIOLOGY | Facility: CLINIC | Age: 59
End: 2024-03-05
Payer: COMMERCIAL

## 2024-03-05 VITALS
HEART RATE: 94 BPM | BODY MASS INDEX: 28.19 KG/M2 | SYSTOLIC BLOOD PRESSURE: 118 MMHG | DIASTOLIC BLOOD PRESSURE: 82 MMHG | WEIGHT: 186 LBS | HEIGHT: 68 IN

## 2024-03-05 DIAGNOSIS — I25.5 ISCHEMIC CARDIOMYOPATHY: ICD-10-CM

## 2024-03-05 DIAGNOSIS — I48.0 PAROXYSMAL ATRIAL FIBRILLATION (MULTI): ICD-10-CM

## 2024-03-05 DIAGNOSIS — I48.20 CHRONIC ATRIAL FIBRILLATION (MULTI): ICD-10-CM

## 2024-03-05 DIAGNOSIS — I51.3 ATRIAL THROMBUS: ICD-10-CM

## 2024-03-05 DIAGNOSIS — I48.4 ATYPICAL ATRIAL FLUTTER (MULTI): Primary | ICD-10-CM

## 2024-03-05 PROBLEM — I48.19 PERSISTENT ATRIAL FIBRILLATION (MULTI): Status: ACTIVE | Noted: 2024-01-29

## 2024-03-05 PROCEDURE — 93000 ELECTROCARDIOGRAM COMPLETE: CPT | Performed by: STUDENT IN AN ORGANIZED HEALTH CARE EDUCATION/TRAINING PROGRAM

## 2024-03-05 PROCEDURE — 1036F TOBACCO NON-USER: CPT | Performed by: STUDENT IN AN ORGANIZED HEALTH CARE EDUCATION/TRAINING PROGRAM

## 2024-03-05 PROCEDURE — 3008F BODY MASS INDEX DOCD: CPT | Performed by: STUDENT IN AN ORGANIZED HEALTH CARE EDUCATION/TRAINING PROGRAM

## 2024-03-05 PROCEDURE — 3074F SYST BP LT 130 MM HG: CPT | Performed by: STUDENT IN AN ORGANIZED HEALTH CARE EDUCATION/TRAINING PROGRAM

## 2024-03-05 PROCEDURE — 99215 OFFICE O/P EST HI 40 MIN: CPT | Performed by: STUDENT IN AN ORGANIZED HEALTH CARE EDUCATION/TRAINING PROGRAM

## 2024-03-05 PROCEDURE — 3079F DIAST BP 80-89 MM HG: CPT | Performed by: STUDENT IN AN ORGANIZED HEALTH CARE EDUCATION/TRAINING PROGRAM

## 2024-03-05 RX ORDER — PREDNISONE 50 MG/1
50 TABLET ORAL DAILY
COMMUNITY
Start: 2024-02-07 | End: 2024-05-29 | Stop reason: WASHOUT

## 2024-03-05 RX ORDER — VALSARTAN 80 MG/1
80 TABLET ORAL DAILY
COMMUNITY

## 2024-03-05 RX ORDER — SULFAMETHOXAZOLE AND TRIMETHOPRIM 800; 160 MG/1; MG/1
1 TABLET ORAL
COMMUNITY
Start: 2024-02-07 | End: 2024-05-29 | Stop reason: WASHOUT

## 2024-03-05 NOTE — LETTER
March 5, 2024     Cody Vieyra MD PhD  [address]  [facility] [address]    Patient: Vladimir Forrester   YOB: 1965   Date of Visit: 3/5/2024       Dear Dr. Cody Vieyra MD PhD:    Thank you for referring Vladimir Forrester to me for evaluation. Below are my notes for this consultation.  If you have questions, please do not hesitate to call me. I look forward to following your patient along with you.     I think he would really benefit from rhythm control.  Amiodarone does not seem of like a reasonable option given his lung pathology and young age.  Sotalol/ Tikosyn also less ideal given his renal dysfunction.  He continues to be in atypical flutter but is rate controlled and I think an ablative strategy would be better but the things that give me pause are his steroid use and his uncertain lung pathology.  Currently he is on pretty high-dose prednisone and hopefully once he is able to come off of it completely or at least go to a very low maintenance dose his risk of cardiac perforation, vascular injury decreases in the setting of an ablation.  He is supposed to see pulmonary on the 19th and I will see him again afterwards so that we can decide how to plan for rhythm control for him.    Sincerely,     Long Hua MD      CC: Criselda Hawley, APRN-CNP  ______________________________________________________________________________________        Cardiac Electrophysiology Office Visit     Referred by Cody Isabel MD* for   Chief Complaint   Patient presents with   • New Patient Visit     Vladimir is here for a new patient consult      HPI:  Vladimir Forrester is a 58 y.o. year old male patient with h/o CAD s/p PCI (recently in Oct 2023), previous PE, HTN, HLD, prior HFrEF now recovered to 50%, atrial fibrillation (prior episodes of RVR with recurrence off amiodarone, history of STEPHANIE clot Sept 2022 now resolved on most recent DANTE Oct 2022) presenting today to establish  "care    Objective  Current Outpatient Medications   Medication Instructions   • amLODIPine (Norvasc) 10 mg tablet    • apixaban (Eliquis) 5 mg tablet 1 tablet, oral, 2 times daily   • busPIRone (BUSPAR) 15 mg, oral, Nightly   • clopidogrel (Plavix) 75 mg tablet 1 tablet, oral, Daily   • cyclobenzaprine (FLEXERIL) 10 mg, oral, Nightly PRN   • ergocalciferol (VITAMIN D-2) 1.25 mg, oral, Weekly   • Farxiga 10 mg 1 tablet, oral, Daily before breakfast   • gabapentin (NEURONTIN) 300 mg, oral, Nightly   • levothyroxine (SYNTHROID, LEVOXYL) 25 mcg, oral, Daily   • metoprolol succinate XL (Toprol-XL) 200 mg 24 hr tablet 1 tablet, oral, Daily   • predniSONE (DELTASONE) 50 mg, oral, Daily   • rosuvastatin (CRESTOR) 40 mg, oral, Daily   • sulfamethoxazole-trimethoprim (Bactrim DS) 800-160 mg tablet 1 tablet, oral   • valsartan (DIOVAN) 80 mg, oral, Daily         Visit Vitals  /82 (BP Location: Left arm, Patient Position: Sitting, BP Cuff Size: Adult)   Pulse 94   Ht 1.727 m (5' 8\")   Wt 84.4 kg (186 lb)   BMI 28.28 kg/m²   Smoking Status Former   BSA 2.01 m²      Physical Exam  Vitals reviewed.   Constitutional:       Appearance: Normal appearance.   HENT:      Head: Normocephalic.   Cardiovascular:      Rate and Rhythm: Normal rate and regular rhythm.   Pulmonary:      Effort: Pulmonary effort is normal. No respiratory distress.      Breath sounds: No wheezing.   Skin:     General: Skin is warm and dry.      Capillary Refill: Capillary refill takes less than 2 seconds.   Neurological:      Mental Status: He is alert.   Psychiatric:         Mood and Affect: Mood normal.         My Interpretation of Reviewed Study(s):  Echo (October 2023): Normal left ventricular systolic function with an EF of 55%.  Left atrium is mildly enlarged with right atrium mildly dilated.  Normal right ventricular size and function.  Mild MR.  No pericardial effusion.  Cath (October 2023): 97% stenosis of the distal RCA secondary to severe " in-stent restenosis.  Laser atherectomy with aspiration of the thrombectomy.  Balloon angioplasty with drug-coated balloon.  [id number] Score  Age <65: 0   Sex Male: 0   CHF History Yes: 1   HTN Yes: 1   Stroke/TIA/Thromboembolism Yes: 2   Vascular Dz: CAD/PAD/Aortic Plaque Yes: 1   DM No: 0   Total Score 5       Assessment/Plan   #Persistent atrial fibrillation  #Atypical Atrial flutter  AF Dx History: September 2022; h/o Cardioversion: Yes; AAD Use: Amiodarone 200mg (stopped due to avoidance of long term use); Anticoagulation use: Apixaban 5mg BID (current); h/o Ablation: NOne; [id number] Score: 5.  Patient has had atrial fibrillation and after prior cardioversion back in October 2022 was doing well was discontinued from amiodarone and had recurrence currently on amiodarone maintaining sinus rhythm based on recent ECGs from October.  Interestingly patient has a significant KS prolongation which likely is related to amiodarone however given patient's extracardiac lymphadenopathy highly concerning for sarcoidosis cardiac involvement could be suspected.  Prior cardiac MRI from October 2023 did not show any cardiac involvement and therefore as of now there is no definitive diagnosis.  Patient also has no tissue diagnosis of his lymphadenopathy and therefore it is unclear if this is true inflammatory autoimmune process or other etiology of lymphadenopathy.  In terms of rhythm control given patient's young age antiarrhythmics versus ablation both would be reasonable.  Antiarrhythmic therapy with Tikosyn/sotalol could be considered however patient's last serum creatinine was 2.4 and would not be a candidate for class III medication.  Amiodarone as long-term rhythm control is not ideal given his young age and possible lung pathology.  At this point ablation seems like the only feasible option, but his prednisone use 50mg recently, his lung pathology all increase his alcides-procedural risk.    - c/w AC: Apixaban 5mg  BID  - c/w Toprol XL 200mg daily  - Will need to evaluate the feasibility of performing ablation after pt has been off or on decreased dose of prednisone.  Will reevaluate after his upcoming visit with pulmonology.    Return to Clinic: Patient should return to the EP Clinic in 6-8 weeks    Long Hua MD [facility]  Cardiac Electrophysiology  [email or web address]    **Disclaimer: This note was dictated by speech recognition, and every effort has been made to prevent any error in transcription, however minor errors may be present**

## 2024-03-05 NOTE — LETTER
March 5, 2024     Patient: Vladimir Forrester   YOB: 1965   Date of Visit: 3/5/2024       To Whom It May Concern:    Vladimir Forrester was seen in my clinic on 3/5/2024 at 8:30 am. Please excuse Vladimir for his absence from work on this day to make the appointment. He can return to work on 3/6/2024    If you have any questions or concerns, please don't hesitate to call.         Sincerely,         Long Hua MD        CC: No Recipients

## 2024-03-19 DIAGNOSIS — I48.4 ATYPICAL ATRIAL FLUTTER (MULTI): ICD-10-CM

## 2024-04-02 ENCOUNTER — APPOINTMENT (OUTPATIENT)
Dept: CARDIOLOGY | Facility: CLINIC | Age: 59
End: 2024-04-02
Payer: COMMERCIAL

## 2024-04-03 ENCOUNTER — LAB (OUTPATIENT)
Dept: LAB | Facility: LAB | Age: 59
End: 2024-04-03
Payer: COMMERCIAL

## 2024-04-03 DIAGNOSIS — D86.0 SARCOIDOSIS OF LUNG (MULTI): Primary | ICD-10-CM

## 2024-04-03 LAB
ALBUMIN SERPL BCP-MCNC: 3.6 G/DL (ref 3.4–5)
ALP SERPL-CCNC: 68 U/L (ref 33–120)
ALT SERPL W P-5'-P-CCNC: 26 U/L (ref 10–52)
AST SERPL W P-5'-P-CCNC: 20 U/L (ref 9–39)
BASOPHILS # BLD AUTO: 0.04 X10*3/UL (ref 0–0.1)
BASOPHILS NFR BLD AUTO: 0.4 %
BILIRUB DIRECT SERPL-MCNC: 0.1 MG/DL (ref 0–0.3)
BILIRUB SERPL-MCNC: 0.6 MG/DL (ref 0–1.2)
CA-I BLD-SCNC: 1.23 MMOL/L (ref 1.1–1.33)
EOSINOPHIL # BLD AUTO: 0.03 X10*3/UL (ref 0–0.7)
EOSINOPHIL NFR BLD AUTO: 0.3 %
ERYTHROCYTE [DISTWIDTH] IN BLOOD BY AUTOMATED COUNT: 18.3 % (ref 11.5–14.5)
ERYTHROCYTE [SEDIMENTATION RATE] IN BLOOD BY WESTERGREN METHOD: <1 MM/H (ref 0–20)
EST. AVERAGE GLUCOSE BLD GHB EST-MCNC: 114 MG/DL
HBA1C MFR BLD: 5.6 %
HCT VFR BLD AUTO: 44.1 % (ref 41–52)
HGB BLD-MCNC: 13.7 G/DL (ref 13.5–17.5)
IMM GRANULOCYTES # BLD AUTO: 0.46 X10*3/UL (ref 0–0.7)
IMM GRANULOCYTES NFR BLD AUTO: 4.4 % (ref 0–0.9)
LYMPHOCYTES # BLD AUTO: 1.55 X10*3/UL (ref 1.2–4.8)
LYMPHOCYTES NFR BLD AUTO: 14.8 %
MCH RBC QN AUTO: 30.2 PG (ref 26–34)
MCHC RBC AUTO-ENTMCNC: 31.1 G/DL (ref 32–36)
MCV RBC AUTO: 97 FL (ref 80–100)
MONOCYTES # BLD AUTO: 1.11 X10*3/UL (ref 0.1–1)
MONOCYTES NFR BLD AUTO: 10.6 %
NEUTROPHILS # BLD AUTO: 7.3 X10*3/UL (ref 1.2–7.7)
NEUTROPHILS NFR BLD AUTO: 69.5 %
NRBC BLD-RTO: 0 /100 WBCS (ref 0–0)
PLATELET # BLD AUTO: 228 X10*3/UL (ref 150–450)
PROT SERPL-MCNC: 5.3 G/DL (ref 6.4–8.2)
RBC # BLD AUTO: 4.53 X10*6/UL (ref 4.5–5.9)
WBC # BLD AUTO: 10.5 X10*3/UL (ref 4.4–11.3)

## 2024-04-03 PROCEDURE — 82330 ASSAY OF CALCIUM: CPT

## 2024-04-03 PROCEDURE — 80076 HEPATIC FUNCTION PANEL: CPT

## 2024-04-03 PROCEDURE — 85652 RBC SED RATE AUTOMATED: CPT

## 2024-04-03 PROCEDURE — 83036 HEMOGLOBIN GLYCOSYLATED A1C: CPT

## 2024-04-03 PROCEDURE — 36415 COLL VENOUS BLD VENIPUNCTURE: CPT

## 2024-04-03 PROCEDURE — 85025 COMPLETE CBC W/AUTO DIFF WBC: CPT

## 2024-04-11 DIAGNOSIS — I48.20 CHRONIC ATRIAL FIBRILLATION (MULTI): ICD-10-CM

## 2024-04-11 RX ORDER — METOPROLOL SUCCINATE 200 MG/1
200 TABLET, EXTENDED RELEASE ORAL DAILY
Qty: 90 TABLET | Refills: 3 | Status: SHIPPED | OUTPATIENT
Start: 2024-04-11

## 2024-04-11 RX ORDER — CLOPIDOGREL BISULFATE 75 MG/1
75 TABLET ORAL DAILY
Qty: 90 TABLET | Refills: 3 | Status: SHIPPED | OUTPATIENT
Start: 2024-04-11

## 2024-04-16 ENCOUNTER — OFFICE VISIT (OUTPATIENT)
Dept: CARDIOLOGY | Facility: CLINIC | Age: 59
End: 2024-04-16
Payer: COMMERCIAL

## 2024-04-16 VITALS
OXYGEN SATURATION: 97 % | DIASTOLIC BLOOD PRESSURE: 80 MMHG | HEART RATE: 63 BPM | RESPIRATION RATE: 16 BRPM | BODY MASS INDEX: 31.32 KG/M2 | WEIGHT: 206 LBS | SYSTOLIC BLOOD PRESSURE: 136 MMHG

## 2024-04-16 DIAGNOSIS — D86.9 SARCOIDOSIS: ICD-10-CM

## 2024-04-16 DIAGNOSIS — I48.19 PERSISTENT ATRIAL FIBRILLATION (MULTI): ICD-10-CM

## 2024-04-16 DIAGNOSIS — E78.49 OTHER HYPERLIPIDEMIA: Primary | ICD-10-CM

## 2024-04-16 PROCEDURE — 3079F DIAST BP 80-89 MM HG: CPT | Performed by: STUDENT IN AN ORGANIZED HEALTH CARE EDUCATION/TRAINING PROGRAM

## 2024-04-16 PROCEDURE — 93000 ELECTROCARDIOGRAM COMPLETE: CPT | Performed by: STUDENT IN AN ORGANIZED HEALTH CARE EDUCATION/TRAINING PROGRAM

## 2024-04-16 PROCEDURE — 3075F SYST BP GE 130 - 139MM HG: CPT | Performed by: STUDENT IN AN ORGANIZED HEALTH CARE EDUCATION/TRAINING PROGRAM

## 2024-04-16 PROCEDURE — 99214 OFFICE O/P EST MOD 30 MIN: CPT | Performed by: STUDENT IN AN ORGANIZED HEALTH CARE EDUCATION/TRAINING PROGRAM

## 2024-04-16 RX ORDER — PANTOPRAZOLE SODIUM 40 MG/1
40 TABLET, DELAYED RELEASE ORAL
Qty: 90 TABLET | Refills: 3 | Status: SHIPPED | OUTPATIENT
Start: 2024-04-16 | End: 2025-04-16

## 2024-04-16 NOTE — LETTER
April 16, 2024     Patient: Vladimir Forrester   YOB: 1965   Date of Visit: 4/16/2024       To Whom It May Concern:    Vladimir Forrester was seen in my clinic on 4/16/2024 at 2:30 pm. Please excuse Vladimir for his absence from work on this day to make the appointment.    If you have any questions or concerns, please don't hesitate to call.         Sincerely,         Long Hua MD        CC: No Recipients

## 2024-04-16 NOTE — PROGRESS NOTES
Cardiac Electrophysiology Office Visit     Referred by Dr. Reed ref. provider found for   Chief Complaint   Patient presents with    Follow-up    Atrial Fibrillation     HPI:  Vladimir Forrester is a 58 y.o. year old male patient with h/o CAD s/p PCI (recently in Oct 2023), previous PE, HTN, HLD, prior HFrEF now recovered to 50%, atrial fibrillation (prior episodes of RVR with recurrence off amiodarone, history of STEPHANIE clot Sept 2022 now resolved on most recent DANTE Oct 2022) presenting today to establish care    Objective  Current Outpatient Medications   Medication Instructions    amLODIPine (Norvasc) 10 mg tablet     apixaban (ELIQUIS) 5 mg, oral, 2 times daily    busPIRone (BUSPAR) 15 mg, oral, Nightly    clopidogrel (PLAVIX) 75 mg, oral, Daily    cyclobenzaprine (FLEXERIL) 10 mg, oral, Nightly PRN    ergocalciferol (VITAMIN D-2) 1.25 mg, oral, Once Weekly    Farxiga 10 mg 1 tablet, oral, Daily before breakfast    gabapentin (NEURONTIN) 300 mg, oral, Nightly    levothyroxine (SYNTHROID, LEVOXYL) 25 mcg, oral, Daily    metoprolol succinate XL (TOPROL-XL) 200 mg, oral, Daily    pantoprazole (PROTONIX) 40 mg, oral, Daily before breakfast, Do not crush, chew, or split.    predniSONE (DELTASONE) 50 mg, oral, Daily    rosuvastatin (CRESTOR) 40 mg, oral, Daily    sulfamethoxazole-trimethoprim (Bactrim DS) 800-160 mg tablet 1 tablet, oral    valsartan (DIOVAN) 80 mg, oral, Daily         Visit Vitals  /80 (BP Location: Left arm, Patient Position: Sitting)   Pulse 63   Resp 16   Wt 93.4 kg (206 lb)   SpO2 97%   BMI 31.32 kg/m²   Smoking Status Former   BSA 2.12 m²      Physical Exam  Vitals reviewed.   Constitutional:       Appearance: Normal appearance.   HENT:      Head: Normocephalic.   Cardiovascular:      Rate and Rhythm: Normal rate and regular rhythm.   Pulmonary:      Effort: Pulmonary effort is normal. No respiratory distress.      Breath sounds: No wheezing.   Skin:     General: Skin is warm and dry.       Capillary Refill: Capillary refill takes less than 2 seconds.   Neurological:      Mental Status: He is alert.   Psychiatric:         Mood and Affect: Mood normal.        My Interpretation of Reviewed Study(s):  Echo (October 2023): Normal left ventricular systolic function with an EF of 55%.  Left atrium is mildly enlarged with right atrium mildly dilated.  Normal right ventricular size and function.  Mild MR.  No pericardial effusion.  Cath (October 2023): 97% stenosis of the distal RCA secondary to severe in-stent restenosis.  Laser atherectomy with aspiration of the thrombectomy.  Balloon angioplasty with drug-coated balloon.  [id number] Score  Age <65: 0   Sex Male: 0   CHF History Yes: 1   HTN Yes: 1   Stroke/TIA/Thromboembolism Yes: 2   Vascular Dz: CAD/PAD/Aortic Plaque Yes: 1   DM No: 0   Total Score 5     Assessment/Plan   #Persistent atrial fibrillation  #Atypical Atrial flutter  AF Dx History: September 2022; h/o Cardioversion: Yes; AAD Use: Amiodarone 200mg (stopped due to avoidance of long term use); Anticoagulation use: Apixaban 5mg BID (current); h/o Ablation: None; [id number] Score: 5.  Patient has had atrial fibrillation and after prior cardioversion back in October 2022 was doing well was discontinued from amiodarone and had recurrence currently on amiodarone maintaining sinus rhythm based on recent ECGs from October.  Interestingly patient has a significant [place] prolongation which likely is related to amiodarone however given patient's extracardiac lymphadenopathy highly concerning for sarcoidosis cardiac involvement could be suspected.  Prior cardiac MRI from October 2023 did not show any cardiac involvement and therefore as of now there is no definitive diagnosis.  Patient also has no tissue diagnosis of his lymphadenopathy and therefore it is unclear if this is true inflammatory autoimmune process or other etiology of lymphadenopathy.  In terms of rhythm control given patient's young age  antiarrhythmics versus ablation both would be reasonable.  Antiarrhythmic therapy with Tikosyn/sotalol could be considered however patient's last serum creatinine was 2.4 and would not be a candidate for class III medication.  Amiodarone as long-term rhythm control is not ideal given his young age and possible lung pathology and prior significant [place] prolongation with the use of amiodarone.  At this point ablation seems like the only feasible option, but his prednisone use 50mg recently, his lung pathology all increase his alcides-procedural risk.  Patient also appears to be relatively rate controlled in this time and therefore there is no urgency and procedural approach with increased risk and we can wait till patient has a better risk profile to proceed with ablation.  c/w AC: Apixaban 5mg BID  c/w Toprol XL 200mg daily  Will need to evaluate the feasibility of performing ablation after pt has been off or on decreased dose of prednisone.  Will reevaluate after his upcoming visit with pulmonology.    Return to Clinic: Patient should return to the EP Clinic in 3 months    Long Hua MD [facility]  Cardiac Electrophysiology  [email or web address]    **Disclaimer: This note was dictated by speech recognition, and every effort has been made to prevent any error in transcription, however minor errors may be present**

## 2024-04-21 ENCOUNTER — HOSPITAL ENCOUNTER (OUTPATIENT)
Dept: RADIOLOGY | Facility: HOSPITAL | Age: 59
Discharge: HOME | End: 2024-04-21
Payer: COMMERCIAL

## 2024-04-21 DIAGNOSIS — D86.0 SARCOIDOSIS OF LUNG (MULTI): ICD-10-CM

## 2024-04-21 PROCEDURE — 71250 CT THORAX DX C-: CPT

## 2024-04-21 PROCEDURE — 71250 CT THORAX DX C-: CPT | Performed by: RADIOLOGY

## 2024-05-07 ENCOUNTER — HOSPITAL ENCOUNTER (OUTPATIENT)
Dept: RESPIRATORY THERAPY | Facility: HOSPITAL | Age: 59
Discharge: HOME | End: 2024-05-07
Payer: COMMERCIAL

## 2024-05-07 DIAGNOSIS — D86.0 PULMONARY SARCOIDOSIS (MULTI): ICD-10-CM

## 2024-05-07 PROCEDURE — 94726 PLETHYSMOGRAPHY LUNG VOLUMES: CPT

## 2024-05-08 LAB
MGC ASCENT PFT - FEV1 - POST: 2.13
MGC ASCENT PFT - FEV1 - PRE: 2.05
MGC ASCENT PFT - FEV1 - PREDICTED: 3.17
MGC ASCENT PFT - FVC - POST: 3.13
MGC ASCENT PFT - FVC - PRE: 3.1
MGC ASCENT PFT - FVC - PREDICTED: 4.03

## 2024-05-29 ENCOUNTER — LAB (OUTPATIENT)
Dept: LAB | Facility: LAB | Age: 59
End: 2024-05-29
Payer: COMMERCIAL

## 2024-05-29 ENCOUNTER — OFFICE VISIT (OUTPATIENT)
Dept: CARDIOLOGY | Facility: CLINIC | Age: 59
End: 2024-05-29
Payer: COMMERCIAL

## 2024-05-29 VITALS
OXYGEN SATURATION: 92 % | WEIGHT: 199.2 LBS | DIASTOLIC BLOOD PRESSURE: 80 MMHG | BODY MASS INDEX: 30.19 KG/M2 | SYSTOLIC BLOOD PRESSURE: 138 MMHG | HEIGHT: 68 IN | HEART RATE: 68 BPM

## 2024-05-29 DIAGNOSIS — R06.02 SHORTNESS OF BREATH: ICD-10-CM

## 2024-05-29 DIAGNOSIS — I50.20 HFREF (HEART FAILURE WITH REDUCED EJECTION FRACTION) (MULTI): ICD-10-CM

## 2024-05-29 DIAGNOSIS — I48.19 PERSISTENT ATRIAL FIBRILLATION (MULTI): ICD-10-CM

## 2024-05-29 DIAGNOSIS — R60.9 PERIPHERAL EDEMA: ICD-10-CM

## 2024-05-29 DIAGNOSIS — D86.9 SARCOIDOSIS: ICD-10-CM

## 2024-05-29 DIAGNOSIS — R06.02 SHORTNESS OF BREATH: Primary | ICD-10-CM

## 2024-05-29 PROBLEM — R60.0 PERIPHERAL EDEMA: Status: ACTIVE | Noted: 2024-05-29

## 2024-05-29 LAB
ANION GAP SERPL CALC-SCNC: 12 MMOL/L (ref 10–20)
BUN SERPL-MCNC: 20 MG/DL (ref 6–23)
CALCIUM SERPL-MCNC: 9.2 MG/DL (ref 8.6–10.3)
CHLORIDE SERPL-SCNC: 102 MMOL/L (ref 98–107)
CO2 SERPL-SCNC: 33 MMOL/L (ref 21–32)
CREAT SERPL-MCNC: 2 MG/DL (ref 0.5–1.3)
EGFRCR SERPLBLD CKD-EPI 2021: 38 ML/MIN/1.73M*2
GLUCOSE SERPL-MCNC: 80 MG/DL (ref 74–99)
POTASSIUM SERPL-SCNC: 3.5 MMOL/L (ref 3.5–5.3)
SODIUM SERPL-SCNC: 143 MMOL/L (ref 136–145)

## 2024-05-29 PROCEDURE — 3075F SYST BP GE 130 - 139MM HG: CPT | Performed by: PHYSICIAN ASSISTANT

## 2024-05-29 PROCEDURE — 80048 BASIC METABOLIC PNL TOTAL CA: CPT

## 2024-05-29 PROCEDURE — 99214 OFFICE O/P EST MOD 30 MIN: CPT | Performed by: PHYSICIAN ASSISTANT

## 2024-05-29 PROCEDURE — 3079F DIAST BP 80-89 MM HG: CPT | Performed by: PHYSICIAN ASSISTANT

## 2024-05-29 PROCEDURE — 36415 COLL VENOUS BLD VENIPUNCTURE: CPT

## 2024-05-29 PROCEDURE — 1036F TOBACCO NON-USER: CPT | Performed by: PHYSICIAN ASSISTANT

## 2024-05-29 RX ORDER — ALBUTEROL SULFATE 90 UG/1
2 AEROSOL, METERED RESPIRATORY (INHALATION) EVERY 6 HOURS PRN
Qty: 18 G | Refills: 1 | Status: SHIPPED | OUTPATIENT
Start: 2024-05-29 | End: 2025-05-29

## 2024-05-29 RX ORDER — PREDNISONE 20 MG/1
40 TABLET ORAL DAILY
COMMUNITY
Start: 2024-05-06 | End: 2024-05-29 | Stop reason: WASHOUT

## 2024-05-29 RX ORDER — FUROSEMIDE 20 MG/1
20 TABLET ORAL DAILY
Qty: 30 TABLET | Refills: 11 | Status: SHIPPED | OUTPATIENT
Start: 2024-05-29 | End: 2025-05-29

## 2024-05-29 NOTE — PROGRESS NOTES
"Chief Complaint:   Atrial Fibrillation, Cardiomyopathy, Coronary Artery Disease, Hyperlipidemia, Hypertension, Heart Failure, Hx of PE, and stent (AFIB/edema)     History Of Present Illness:    Vladimir Forrester is a 58 y.o. male presenting with dyspnea on exertion and BLE edema.  Patient recently initiated a course of corticosteroids for treatment of underlying pulmonary conditions.  Subsequently he noted increased peripheral edema and progressive shortness of breath/orthopnea - patient stopped CS on his own accord.  +Known history of HFrEF with subsequent normalization of LV systolic function.  No recurrent anginal symptoms.     Last Recorded Vitals:  Vitals:    05/29/24 1409   BP: 138/80   BP Location: Left arm   Patient Position: Sitting   BP Cuff Size: Adult   Pulse: 68   SpO2: 92%   Weight: 90.4 kg (199 lb 3.2 oz)   Height: 1.727 m (5' 8\")       Past Medical History:  He has a past medical history of Abnormal findings on diagnostic imaging of other specified body structures, Abnormal findings on diagnostic imaging of other specified body structures, Personal history of other medical treatment, and Personal history of other medical treatment.    Past Surgical History:  He has a past surgical history that includes Carotid stent (05/30/2023); US guided mediastinum percutaneous biopsy (7/17/2023); Cardiac catheterization (N/A, 10/18/2023); Cardiac catheterization (N/A, 10/18/2023); Cardiac catheterization (Right, 10/18/2023); Cardiac catheterization (N/A, 10/18/2023); and Cardiac catheterization (N/A, 10/18/2023).      Social History:  He reports that he quit smoking about 51 years ago. His smoking use included cigarettes. He has never used smokeless tobacco. He reports that he does not currently use alcohol. He reports that he does not use drugs.    Family History:  Family History   Problem Relation Name Age of Onset    Bone cancer Father      Brain cancer Brother      Throat cancer Brother      Lung cancer Brother   "        Allergies:  Other    Outpatient Medications:  Current Outpatient Medications   Medication Instructions    amLODIPine (Norvasc) 10 mg tablet     apixaban (ELIQUIS) 5 mg, oral, 2 times daily    busPIRone (BUSPAR) 15 mg, oral, Nightly    clopidogrel (PLAVIX) 75 mg, oral, Daily    cyclobenzaprine (FLEXERIL) 10 mg, oral, Nightly PRN    ergocalciferol (VITAMIN D-2) 1.25 mg, oral, Once Weekly    Farxiga 10 mg 1 tablet, oral, Daily before breakfast    levothyroxine (SYNTHROID, LEVOXYL) 25 mcg, oral, Daily    metoprolol succinate XL (TOPROL-XL) 200 mg, oral, Daily    pantoprazole (PROTONIX) 40 mg, oral, Daily before breakfast, Do not crush, chew, or split.    rosuvastatin (CRESTOR) 40 mg, oral, Daily    valsartan (DIOVAN) 80 mg, oral, Daily       Physical Exam:  Constitutional: awake and alert, oriented ×3, no apparent distress  Skin: warm, dry, good turgor no obvious lesions  Eyes: pupils equal, round, reactive to light, conjunctiva pink and noninjected, no discharge  HENT: normocephalic and atraumatic, mucous membranes moist, trachea midline with no masses/goiter  Cardiovascular: S1/S2 irregular, no murmur no rubs/gallops, no carotid bruits, no JVD  Pulmonary: symmetrical chest expansion, lungs are clear to auscultation bilaterally, no wheezes/rales/rhonchi, normal effort  Abdomen: nontender, nondistended, active bowel sounds, no ascites  Extremities: no cyanosis, clubbing, trace LE edema no lesions; palpable pedal pulses  Neurologic: cranial nerves II - XII grossly intact, stable gait, no tremor       Last Labs:  CBC -  Lab Results   Component Value Date    WBC 10.5 04/03/2024    HGB 13.7 04/03/2024    HCT 44.1 04/03/2024    MCV 97 04/03/2024     04/03/2024       CMP -  Lab Results   Component Value Date    CALCIUM 10.3 12/15/2023    PHOS 4.5 08/22/2023    PROT 5.3 (L) 04/03/2024    ALBUMIN 3.6 04/03/2024    AST 20 04/03/2024    ALT 26 04/03/2024    ALKPHOS 68 04/03/2024    BILITOT 0.6 04/03/2024       LIPID  "PANEL -   Lab Results   Component Value Date    CHOL 100 07/01/2023    TRIG 66 07/01/2023    HDL 42.1 07/01/2023    CHHDL 2.4 07/01/2023    LDLF 45 07/01/2023    VLDL 13 07/01/2023       RENAL FUNCTION PANEL -   Lab Results   Component Value Date    GLUCOSE 85 12/15/2023     12/15/2023    K 3.6 12/15/2023     12/15/2023    CO2 26 12/15/2023    ANIONGAP 13 12/15/2023    BUN 33 (H) 12/15/2023    CREATININE 2.41 (H) 12/15/2023    GFRMALE 30 (A) 09/09/2023    CALCIUM 10.3 12/15/2023    PHOS 4.5 08/22/2023    ALBUMIN 3.6 04/03/2024        Lab Results   Component Value Date     (H) 07/20/2023    HGBA1C 5.6 04/03/2024       Last Cardiology Tests:  ECG:  ECG 12 lead (Clinic Performed) 04/16/2024      Echo:  Transthoracic Echo (TTE) Limited 10/17/2023      Ejection Fractions:  No results found for: \"EF\"    Cath:  Cardiac catheterization - coronary 10/18/2023      Stress Test:  No results found for this or any previous visit from the past 1095 days.      Cardiac Imaging:  MR cardiac morphology and function w and wo IV contrast 10/17/2023      Assessment/Plan   Problem List Items Addressed This Visit             ICD-10-CM       Cardiac and Vasculature    HFrEF (heart failure with reduced ejection fraction) (Multi) I50.20    Relevant Medications    albuterol 90 mcg/actuation inhaler    furosemide (Lasix) 20 mg tablet    Other Relevant Orders    Basic metabolic panel    Persistent atrial fibrillation (Multi) I48.19       Multi-system (Lupus, Sarcoid)    Sarcoidosis D86.9    Relevant Medications    albuterol 90 mcg/actuation inhaler    furosemide (Lasix) 20 mg tablet    Other Relevant Orders    Basic metabolic panel       Pulmonary and Pneumonias    Shortness of breath - Primary R06.02    Relevant Medications    albuterol 90 mcg/actuation inhaler    furosemide (Lasix) 20 mg tablet    Other Relevant Orders    Basic metabolic panel       Symptoms and Signs    Peripheral edema R60.9    Relevant Medications    " albuterol 90 mcg/actuation inhaler    furosemide (Lasix) 20 mg tablet    Other Relevant Orders    Basic metabolic panel       -Initiate furosemide 20mg daily    -Albuterol inhaler provided as professional courtesy    -Continue DOAC for CVA prophylaxis    -Repeat BMP in 2 weeks    -Maintain appointment with Dr. Hua in the near future as previously scheduled      MARYLOU HaynesC

## 2024-05-29 NOTE — LETTER
May 29, 2024     Patient: Vladimir Forrester   YOB: 1965   Date of Visit: 5/29/2024       To Whom It May Concern:    Vladimir Forrester was seen in my clinic on 5/29/2024 at 2:30 pm. Please excuse Vladimir for his absence from work on this day to make the appointment.    If you have any questions or concerns, please don't hesitate to call.         Sincerely,         Joshua Montalvo, JACKIE        CC: No Recipients

## 2024-05-29 NOTE — PATIENT INSTRUCTIONS
-I am starting you back on Lasix 20mg daily to reduce the overall fluid overload/edema    -I prescribed an albuterol inhaler to use as needed    -Please keep your follow up appointment with pulmonology    -Repeat blood work in 2 weeks to reassess potassium and kidney function

## 2024-06-10 ENCOUNTER — APPOINTMENT (OUTPATIENT)
Dept: CARDIOLOGY | Facility: CLINIC | Age: 59
End: 2024-06-10
Payer: COMMERCIAL

## 2024-06-22 ENCOUNTER — HOSPITAL ENCOUNTER (OUTPATIENT)
Dept: RADIOLOGY | Facility: HOSPITAL | Age: 59
Discharge: HOME | End: 2024-06-22
Payer: COMMERCIAL

## 2024-06-22 DIAGNOSIS — D86.0 SARCOIDOSIS OF LUNG (MULTI): ICD-10-CM

## 2024-06-22 PROCEDURE — 71250 CT THORAX DX C-: CPT | Performed by: RADIOLOGY

## 2024-06-22 PROCEDURE — 71250 CT THORAX DX C-: CPT

## 2024-07-16 ENCOUNTER — APPOINTMENT (OUTPATIENT)
Dept: CARDIOLOGY | Facility: CLINIC | Age: 59
End: 2024-07-16
Payer: COMMERCIAL

## 2024-07-25 DIAGNOSIS — E03.8 SUBCLINICAL HYPOTHYROIDISM: ICD-10-CM

## 2024-07-26 RX ORDER — LEVOTHYROXINE SODIUM 25 UG/1
25 TABLET ORAL DAILY
Qty: 90 TABLET | Refills: 3 | Status: SHIPPED | OUTPATIENT
Start: 2024-07-26

## 2024-08-27 ENCOUNTER — APPOINTMENT (OUTPATIENT)
Dept: RESPIRATORY THERAPY | Facility: HOSPITAL | Age: 59
End: 2024-08-27
Payer: COMMERCIAL

## 2024-09-16 ENCOUNTER — PATIENT MESSAGE (OUTPATIENT)
Dept: RHEUMATOLOGY | Facility: CLINIC | Age: 59
End: 2024-09-16
Payer: COMMERCIAL

## 2024-09-19 ENCOUNTER — APPOINTMENT (OUTPATIENT)
Dept: RESPIRATORY THERAPY | Facility: HOSPITAL | Age: 59
End: 2024-09-19
Payer: COMMERCIAL

## 2024-09-25 ENCOUNTER — TELEPHONE (OUTPATIENT)
Dept: RHEUMATOLOGY | Facility: CLINIC | Age: 59
End: 2024-09-25
Payer: COMMERCIAL

## 2024-09-25 NOTE — TELEPHONE ENCOUNTER
Attempted to call pt to inform them that we received the referral. Call went to . [facility] informing them.

## 2024-09-30 ENCOUNTER — TELEPHONE (OUTPATIENT)
Dept: RHEUMATOLOGY | Facility: CLINIC | Age: 59
End: 2024-09-30
Payer: COMMERCIAL

## 2024-09-30 NOTE — TELEPHONE ENCOUNTER
Spoke with Janny at Dr Basurto office, once again in regards to this pts referral. She stated she does not know what to tell me. Stated the provider [place] still [place] the country and will not be until the end of October. I informed her we would have to cancel if we do not know what is needed. She stated to just cancel it, they would find someone else

## 2024-10-15 ENCOUNTER — OFFICE VISIT (OUTPATIENT)
Dept: CARDIOLOGY | Facility: CLINIC | Age: 59
End: 2024-10-15
Payer: COMMERCIAL

## 2024-10-15 VITALS
SYSTOLIC BLOOD PRESSURE: 130 MMHG | BODY MASS INDEX: 27.89 KG/M2 | DIASTOLIC BLOOD PRESSURE: 82 MMHG | HEIGHT: 68 IN | OXYGEN SATURATION: 98 % | WEIGHT: 184 LBS | HEART RATE: 78 BPM

## 2024-10-15 DIAGNOSIS — I48.19 PERSISTENT ATRIAL FIBRILLATION (MULTI): ICD-10-CM

## 2024-10-15 DIAGNOSIS — I50.20 HFREF (HEART FAILURE WITH REDUCED EJECTION FRACTION): Primary | ICD-10-CM

## 2024-10-15 DIAGNOSIS — I48.4 ATYPICAL ATRIAL FLUTTER (MULTI): ICD-10-CM

## 2024-10-15 DIAGNOSIS — I10 PRIMARY HYPERTENSION: ICD-10-CM

## 2024-10-15 PROCEDURE — 99214 OFFICE O/P EST MOD 30 MIN: CPT | Performed by: STUDENT IN AN ORGANIZED HEALTH CARE EDUCATION/TRAINING PROGRAM

## 2024-10-15 PROCEDURE — 93005 ELECTROCARDIOGRAM TRACING: CPT | Performed by: STUDENT IN AN ORGANIZED HEALTH CARE EDUCATION/TRAINING PROGRAM

## 2024-10-15 PROCEDURE — 3008F BODY MASS INDEX DOCD: CPT | Performed by: STUDENT IN AN ORGANIZED HEALTH CARE EDUCATION/TRAINING PROGRAM

## 2024-10-15 PROCEDURE — 3075F SYST BP GE 130 - 139MM HG: CPT | Performed by: STUDENT IN AN ORGANIZED HEALTH CARE EDUCATION/TRAINING PROGRAM

## 2024-10-15 PROCEDURE — 1036F TOBACCO NON-USER: CPT | Performed by: STUDENT IN AN ORGANIZED HEALTH CARE EDUCATION/TRAINING PROGRAM

## 2024-10-15 PROCEDURE — 3079F DIAST BP 80-89 MM HG: CPT | Performed by: STUDENT IN AN ORGANIZED HEALTH CARE EDUCATION/TRAINING PROGRAM

## 2024-10-15 PROCEDURE — 93010 ELECTROCARDIOGRAM REPORT: CPT | Performed by: STUDENT IN AN ORGANIZED HEALTH CARE EDUCATION/TRAINING PROGRAM

## 2024-10-15 NOTE — PROGRESS NOTES
"    Cardiac Electrophysiology Office Visit     Referred by Dr. Reed ref. provider found for   Chief Complaint   Patient presents with    Follow-up     HPI:  Vladimir Forrester is a 58 y.o. year old male patient with h/o CAD s/p PCI (recently in Oct 2023), previous PE, HTN, HLD, prior HFrEF now recovered to 50%, atrial fibrillation (prior episodes of RVR with recurrence off amiodarone, history of STEPHANIE clot Sept 2022 now resolved on most recent DANTE Oct 2022) presenting today for follow up    Objective  Current Outpatient Medications   Medication Instructions    albuterol 90 mcg/actuation inhaler 2 puffs, inhalation, Every 6 hours PRN    amLODIPine (Norvasc) 10 mg tablet     apixaban (ELIQUIS) 5 mg, oral, 2 times daily    busPIRone (BUSPAR) 15 mg, oral, Nightly    clopidogrel (PLAVIX) 75 mg, oral, Daily    cyclobenzaprine (FLEXERIL) 10 mg, oral, Nightly PRN    furosemide (LASIX) 20 mg, oral, Daily    levothyroxine (SYNTHROID, LEVOXYL) 25 mcg, oral, Daily    metoprolol succinate XL (TOPROL-XL) 200 mg, oral, Daily    pantoprazole (PROTONIX) 40 mg, oral, Daily before breakfast, Do not crush, chew, or split.    rosuvastatin (CRESTOR) 40 mg, oral, Daily    valsartan (DIOVAN) 80 mg, oral, Daily         Visit Vitals  /82 (BP Location: Left arm, Patient Position: Sitting, BP Cuff Size: Adult)   Pulse 78   Ht 1.727 m (5' 8\")   Wt 83.5 kg (184 lb)   SpO2 98%   BMI 27.98 kg/m²   Smoking Status Former   BSA 2 m²      Physical Exam  Vitals reviewed.   Constitutional:       Appearance: Normal appearance.   HENT:      Head: Normocephalic.   Cardiovascular:      Rate and Rhythm: Normal rate and regular rhythm.   Pulmonary:      Effort: Pulmonary effort is normal. No respiratory distress.      Breath sounds: No wheezing.   Skin:     General: Skin is warm and dry.      Capillary Refill: Capillary refill takes less than 2 seconds.   Neurological:      Mental Status: He is alert.   Psychiatric:         Mood and Affect: Mood normal.      My " Interpretation of Reviewed Study(s):  Echo (October 2023): Normal left ventricular systolic function with an EF of 55%.  Left atrium is mildly enlarged with right atrium mildly dilated.  Normal right ventricular size and function.  Mild MR.  No pericardial effusion.  Cath (October 2023): 97% stenosis of the distal RCA secondary to severe in-stent restenosis.  Laser atherectomy with aspiration of the thrombectomy.  Balloon angioplasty with drug-coated balloon.  [id number] Score  Age <65: 0   Sex Male: 0   CHF History Yes: 1   HTN Yes: 1   Stroke/TIA/Thromboembolism Yes: 2   Vascular Dz: CAD/PAD/Aortic Plaque Yes: 1   DM No: 0   Total Score 5     Assessment/Plan   #Persistent atrial fibrillation  #Atypical Atrial flutter  AF Dx History: September 2022; h/o Cardioversion: Yes; AAD Use: Amiodarone 200mg (stopped due to avoidance of long term use); Anticoagulation use: Apixaban 5mg BID (current); h/o Ablation: None; [id number] Score: 5.  Patient has had atrial fibrillation and after prior cardioversion back in October 2022 was doing well was discontinued from amiodarone and had recurrence currently on amiodarone maintaining sinus rhythm based on recent ECGs from October.  Interestingly patient has a significant [place] prolongation which likely is related to amiodarone however given patient's extracardiac lymphadenopathy highly concerning for sarcoidosis cardiac involvement could be suspected.  Prior cardiac MRI from October 2023 did not show any cardiac involvement and therefore as of now there is no definitive diagnosis.  Patient also has no tissue diagnosis of his lymphadenopathy and therefore it is unclear if this is true inflammatory autoimmune process or other etiology of lymphadenopathy.  In terms of rhythm control given patient's young age antiarrhythmics versus ablation both would be reasonable.  Antiarrhythmic therapy with Tikosyn/sotalol could be considered however patient's last serum creatinine was 2.4 and  would not be a candidate for class III medication.  Amiodarone as long-term rhythm control is not ideal given his young age and possible lung pathology and prior significant MA prolongation with the use of amiodarone.  At this point ablation seems like the only feasible option, but his prednisone use 50mg recently, his lung pathology all increase his alcides-procedural risk.  Patient also appears to be relatively rate controlled in this time and therefore there is no urgency and procedural approach with increased risk and we can wait till patient has a better risk profile to proceed with ablation.  The other concern is about medication compliance, pt is on a few medication   c/w AC: Apixaban 5mg BID  c/w Toprol XL 200mg daily  Will need to evaluate the feasibility of performing ablation after pt has been off or on decreased dose of prednisone.  Will reevaluate after his upcoming visit with pulmonology.    #HFrecEF  #HTN  Pt on multiple medications and is very unclear about which medication he is supposed to be taking.   Will need follow up with PCP and primary cardiology regarding medication regimen    Return to Clinic: Patient should return to the EP Clinic in 6 months    Long Hua MD [facility]  Cardiac Electrophysiology  [email or web address]    **Disclaimer: This note was dictated by speech recognition, and every effort has been made to prevent any error in transcription, however minor errors may be present**

## 2024-10-16 ENCOUNTER — APPOINTMENT (OUTPATIENT)
Dept: RHEUMATOLOGY | Facility: CLINIC | Age: 59
End: 2024-10-16
Payer: COMMERCIAL

## 2024-11-01 ENCOUNTER — OFFICE VISIT (OUTPATIENT)
Dept: CARDIOLOGY | Facility: CLINIC | Age: 59
End: 2024-11-01
Payer: COMMERCIAL

## 2024-11-01 VITALS
WEIGHT: 180.2 LBS | HEART RATE: 67 BPM | BODY MASS INDEX: 27.31 KG/M2 | DIASTOLIC BLOOD PRESSURE: 80 MMHG | OXYGEN SATURATION: 97 % | HEIGHT: 68 IN | SYSTOLIC BLOOD PRESSURE: 110 MMHG

## 2024-11-01 DIAGNOSIS — I48.4 ATYPICAL ATRIAL FLUTTER (MULTI): ICD-10-CM

## 2024-11-01 DIAGNOSIS — I50.20 HFREF (HEART FAILURE WITH REDUCED EJECTION FRACTION): Primary | ICD-10-CM

## 2024-11-01 DIAGNOSIS — I48.19 PERSISTENT ATRIAL FIBRILLATION (MULTI): ICD-10-CM

## 2024-11-01 DIAGNOSIS — I10 PRIMARY HYPERTENSION: ICD-10-CM

## 2024-11-01 PROCEDURE — 3008F BODY MASS INDEX DOCD: CPT | Performed by: PHYSICIAN ASSISTANT

## 2024-11-01 PROCEDURE — 99214 OFFICE O/P EST MOD 30 MIN: CPT | Performed by: PHYSICIAN ASSISTANT

## 2024-11-01 PROCEDURE — 3074F SYST BP LT 130 MM HG: CPT | Performed by: PHYSICIAN ASSISTANT

## 2024-11-01 PROCEDURE — 3079F DIAST BP 80-89 MM HG: CPT | Performed by: PHYSICIAN ASSISTANT

## 2024-11-05 ENCOUNTER — APPOINTMENT (OUTPATIENT)
Dept: RESPIRATORY THERAPY | Facility: HOSPITAL | Age: 59
End: 2024-11-05
Payer: COMMERCIAL

## 2024-11-06 ENCOUNTER — APPOINTMENT (OUTPATIENT)
Dept: RADIOLOGY | Facility: HOSPITAL | Age: 59
End: 2024-11-06
Payer: COMMERCIAL

## 2024-11-26 ENCOUNTER — APPOINTMENT (OUTPATIENT)
Dept: RESPIRATORY THERAPY | Facility: HOSPITAL | Age: 59
End: 2024-11-26
Payer: COMMERCIAL

## 2024-12-18 ENCOUNTER — HOSPITAL ENCOUNTER (OUTPATIENT)
Dept: RESPIRATORY THERAPY | Facility: HOSPITAL | Age: 59
Discharge: HOME | End: 2024-12-18
Payer: COMMERCIAL

## 2024-12-18 DIAGNOSIS — D86.2 SARCOIDOSIS OF LUNG WITH SARCOIDOSIS OF LYMPH NODES (MULTI): ICD-10-CM

## 2024-12-18 LAB
MGC ASCENT PFT - FEV1 - POST: 2.3
MGC ASCENT PFT - FEV1 - PRE: 2.18
MGC ASCENT PFT - FEV1 - PREDICTED: 3.15
MGC ASCENT PFT - FVC - POST: 3.66
MGC ASCENT PFT - FVC - PRE: 3.63
MGC ASCENT PFT - FVC - PREDICTED: 4.02

## 2024-12-18 PROCEDURE — 94060 EVALUATION OF WHEEZING: CPT

## 2025-01-02 ENCOUNTER — APPOINTMENT (OUTPATIENT)
Dept: RADIOLOGY | Facility: CLINIC | Age: 60
End: 2025-01-02
Payer: COMMERCIAL

## 2025-01-09 ENCOUNTER — APPOINTMENT (OUTPATIENT)
Dept: RADIOLOGY | Facility: CLINIC | Age: 60
End: 2025-01-09
Payer: COMMERCIAL

## 2025-01-13 ENCOUNTER — HOSPITAL ENCOUNTER (OUTPATIENT)
Dept: RADIOLOGY | Facility: HOSPITAL | Age: 60
Discharge: HOME | End: 2025-01-13
Payer: COMMERCIAL

## 2025-01-13 DIAGNOSIS — D86.2 SARCOIDOSIS OF LUNG WITH SARCOIDOSIS OF LYMPH NODES (MULTI): ICD-10-CM

## 2025-01-13 PROCEDURE — 71250 CT THORAX DX C-: CPT

## 2025-01-13 PROCEDURE — 71250 CT THORAX DX C-: CPT | Performed by: RADIOLOGY

## 2025-03-12 DIAGNOSIS — I25.10 CORONARY ARTERY DISEASE INVOLVING NATIVE CORONARY ARTERY OF NATIVE HEART WITHOUT ANGINA PECTORIS: ICD-10-CM

## 2025-03-13 RX ORDER — ROSUVASTATIN CALCIUM 40 MG/1
40 TABLET, COATED ORAL DAILY
Qty: 90 TABLET | Refills: 3 | Status: SHIPPED | OUTPATIENT
Start: 2025-03-13 | End: 2026-03-13

## 2025-04-14 ENCOUNTER — APPOINTMENT (OUTPATIENT)
Dept: PRIMARY CARE | Facility: CLINIC | Age: 60
End: 2025-04-14
Payer: COMMERCIAL

## 2025-04-14 ENCOUNTER — OFFICE VISIT (OUTPATIENT)
Dept: PRIMARY CARE | Facility: CLINIC | Age: 60
End: 2025-04-14
Payer: COMMERCIAL

## 2025-04-14 VITALS
TEMPERATURE: 96.9 F | BODY MASS INDEX: 26.24 KG/M2 | OXYGEN SATURATION: 98 % | SYSTOLIC BLOOD PRESSURE: 130 MMHG | HEART RATE: 66 BPM | DIASTOLIC BLOOD PRESSURE: 80 MMHG | WEIGHT: 172.6 LBS

## 2025-04-14 DIAGNOSIS — B02.9 HERPES ZOSTER WITHOUT COMPLICATION: Primary | ICD-10-CM

## 2025-04-14 PROCEDURE — 3079F DIAST BP 80-89 MM HG: CPT | Performed by: NURSE PRACTITIONER

## 2025-04-14 PROCEDURE — 99213 OFFICE O/P EST LOW 20 MIN: CPT | Performed by: NURSE PRACTITIONER

## 2025-04-14 PROCEDURE — 1036F TOBACCO NON-USER: CPT | Performed by: NURSE PRACTITIONER

## 2025-04-14 PROCEDURE — 3075F SYST BP GE 130 - 139MM HG: CPT | Performed by: NURSE PRACTITIONER

## 2025-04-14 RX ORDER — MOMETASONE FUROATE AND FORMOTEROL FUMARATE DIHYDRATE 200; 5 UG/1; UG/1
1 AEROSOL RESPIRATORY (INHALATION) 2 TIMES DAILY
COMMUNITY
Start: 2025-04-03 | End: 2025-07-02

## 2025-04-14 ASSESSMENT — PATIENT HEALTH QUESTIONNAIRE - PHQ9
1. LITTLE INTEREST OR PLEASURE IN DOING THINGS: NOT AT ALL
SUM OF ALL RESPONSES TO PHQ9 QUESTIONS 1 AND 2: 0
2. FEELING DOWN, DEPRESSED OR HOPELESS: NOT AT ALL

## 2025-04-14 ASSESSMENT — ENCOUNTER SYMPTOMS: DEPRESSION: 0

## 2025-04-14 ASSESSMENT — PAIN SCALES - GENERAL: PAINLEVEL_OUTOF10: 0-NO PAIN

## 2025-04-14 NOTE — PROGRESS NOTES
Subjective   Patient ID: Vladimir Forrester is a 59 y.o. male who presents for Herpes Zoster.    Herpes Zoster       Pleasant established 59 y/o male with PMH CKD IV, Afib, HLD, HTN, Cardiomyopathy, Combined CHF, CAD s/p PCI, previous PE, prior HFrEF now recovered to 50%, atrial fibrillation, prior episodes of RVR with recurrence off amiodarone, Hypothyroid, Lung nodules presents for UC follow up  His wife is in the exam room with him today  Due for Annual, last 2/2023    Evaluated 4/5/2025 for c/o rash to left arm radiating to left upper back first noted 2 days earlier. Using otc Shingles cream found online. Today noted rash is scabbed over, no vesicles or drainage noted. Endorses mild itch, worse at night, mild nerve pain. Denies f/c/n/v, no other skin involvement      Review of Systems  Review of Systems   Constitutional: Negative.    HENT: Negative.     Respiratory: Negative.     Cardiovascular: Negative.    Gastrointestinal: Negative.    All other systems reviewed and are negative.  Per HPI    Objective   /80 (BP Location: Left arm, Patient Position: Sitting)   Pulse 66   Temp 36.1 °C (96.9 °F) (Temporal)   Wt 78.3 kg (172 lb 9.6 oz)   SpO2 98%   BMI 26.24 kg/m²     Physical Exam  Vitals reviewed.   Constitutional:       Appearance: Normal appearance.   Cardiovascular:      Rate and Rhythm: Normal rate and regular rhythm.   Pulmonary:      Effort: Pulmonary effort is normal.      Breath sounds: Normal breath sounds.   Musculoskeletal:      Cervical back: Normal range of motion and neck supple.   Skin:     Findings: Rash present.          Neurological:      Mental Status: He is alert.         Assessment/Plan   Problem List Items Addressed This Visit             ICD-10-CM    Herpes zoster without complication - Primary B02.9   Improving  Continue OTC topicals, Tylenol prn  Discussed pathophysiology with pt/spouse

## 2025-04-15 ENCOUNTER — APPOINTMENT (OUTPATIENT)
Dept: CARDIOLOGY | Facility: CLINIC | Age: 60
End: 2025-04-15
Payer: COMMERCIAL

## 2025-04-15 DIAGNOSIS — I48.4 ATYPICAL ATRIAL FLUTTER: ICD-10-CM

## 2025-04-15 DIAGNOSIS — I25.10 CORONARY ARTERY DISEASE INVOLVING NATIVE CORONARY ARTERY OF NATIVE HEART WITHOUT ANGINA PECTORIS: ICD-10-CM

## 2025-04-15 DIAGNOSIS — E03.8 SUBCLINICAL HYPOTHYROIDISM: ICD-10-CM

## 2025-04-15 DIAGNOSIS — I48.20 CHRONIC ATRIAL FIBRILLATION (MULTI): ICD-10-CM

## 2025-04-17 RX ORDER — CLOPIDOGREL BISULFATE 75 MG/1
75 TABLET ORAL DAILY
Qty: 30 TABLET | Refills: 0 | Status: SHIPPED | OUTPATIENT
Start: 2025-04-17 | End: 2025-05-17

## 2025-04-17 RX ORDER — LEVOTHYROXINE SODIUM 25 UG/1
25 TABLET ORAL DAILY
Qty: 30 TABLET | Refills: 0 | Status: SHIPPED | OUTPATIENT
Start: 2025-04-17 | End: 2025-05-17

## 2025-04-17 RX ORDER — ROSUVASTATIN CALCIUM 40 MG/1
40 TABLET, COATED ORAL DAILY
Qty: 30 TABLET | Refills: 0 | Status: SHIPPED | OUTPATIENT
Start: 2025-04-17 | End: 2025-05-17

## 2025-04-17 RX ORDER — METOPROLOL SUCCINATE 200 MG/1
200 TABLET, EXTENDED RELEASE ORAL DAILY
Qty: 30 TABLET | Refills: 0 | Status: SHIPPED | OUTPATIENT
Start: 2025-04-17 | End: 2025-05-17

## 2025-04-23 ENCOUNTER — TELEPHONE (OUTPATIENT)
Dept: PRIMARY CARE | Facility: CLINIC | Age: 60
End: 2025-04-23
Payer: COMMERCIAL

## 2025-04-23 DIAGNOSIS — B02.9 HERPES ZOSTER WITHOUT COMPLICATION: Primary | ICD-10-CM

## 2025-04-23 RX ORDER — VALACYCLOVIR HYDROCHLORIDE 500 MG/1
500 TABLET, FILM COATED ORAL DAILY
Qty: 7 TABLET | Refills: 0 | Status: SHIPPED | OUTPATIENT
Start: 2025-04-23 | End: 2025-04-30

## 2025-04-23 NOTE — TELEPHONE ENCOUNTER
Wife called in she states pts is getting worse he is having burning, itching, tingling nerve pain. And would like an rx called into the pharmacy or do you recommend to go to the hospital or come into the office.

## 2025-05-01 DIAGNOSIS — B02.29 POST HERPETIC NEURALGIA: Primary | ICD-10-CM

## 2025-05-01 RX ORDER — GABAPENTIN 300 MG/1
300 CAPSULE ORAL 2 TIMES DAILY
Qty: 60 CAPSULE | Refills: 0 | Status: SHIPPED | OUTPATIENT
Start: 2025-05-01 | End: 2025-05-31

## 2025-05-18 ASSESSMENT — PROMIS GLOBAL HEALTH SCALE
CARRYOUT_SOCIAL_ACTIVITIES: FAIR
RATE_QUALITY_OF_LIFE: GOOD
RATE_AVERAGE_PAIN: 8
RATE_GENERAL_HEALTH: FAIR
RATE_PHYSICAL_HEALTH: FAIR
RATE_AVERAGE_FATIGUE: MILD
RATE_MENTAL_HEALTH: GOOD
RATE_SOCIAL_SATISFACTION: GOOD
CARRYOUT_PHYSICAL_ACTIVITIES: MOSTLY
EMOTIONAL_PROBLEMS: RARELY

## 2025-05-19 ENCOUNTER — APPOINTMENT (OUTPATIENT)
Dept: PRIMARY CARE | Facility: CLINIC | Age: 60
End: 2025-05-19
Payer: COMMERCIAL

## 2025-05-19 VITALS
TEMPERATURE: 97.9 F | HEART RATE: 78 BPM | WEIGHT: 177 LBS | BODY MASS INDEX: 26.83 KG/M2 | SYSTOLIC BLOOD PRESSURE: 169 MMHG | DIASTOLIC BLOOD PRESSURE: 90 MMHG | OXYGEN SATURATION: 98 % | HEIGHT: 68 IN

## 2025-05-19 DIAGNOSIS — E03.8 SUBCLINICAL HYPOTHYROIDISM: ICD-10-CM

## 2025-05-19 DIAGNOSIS — N18.4 CHRONIC KIDNEY DISEASE, STAGE 4 (SEVERE) (MULTI): ICD-10-CM

## 2025-05-19 DIAGNOSIS — I48.4 ATYPICAL ATRIAL FLUTTER: ICD-10-CM

## 2025-05-19 DIAGNOSIS — G95.20 SCC (SPINAL CORD COMPRESSION) (MULTI): ICD-10-CM

## 2025-05-19 DIAGNOSIS — J44.9 OBSTRUCTIVE LUNG DISEASE (MULTI): ICD-10-CM

## 2025-05-19 DIAGNOSIS — I48.20 CHRONIC ATRIAL FIBRILLATION (MULTI): ICD-10-CM

## 2025-05-19 DIAGNOSIS — I48.20 CHRONIC ATRIAL FIBRILLATION, UNSPECIFIED (MULTI): ICD-10-CM

## 2025-05-19 DIAGNOSIS — D86.0 PULMONARY SARCOIDOSIS (MULTI): ICD-10-CM

## 2025-05-19 DIAGNOSIS — Z00.00 ANNUAL PHYSICAL EXAM: Primary | ICD-10-CM

## 2025-05-19 DIAGNOSIS — I50.21 ACUTE SYSTOLIC (CONGESTIVE) HEART FAILURE: ICD-10-CM

## 2025-05-19 DIAGNOSIS — I27.20 PULMONARY HYPERTENSION, UNSPECIFIED (MULTI): ICD-10-CM

## 2025-05-19 DIAGNOSIS — I25.10 CORONARY ARTERY DISEASE INVOLVING NATIVE CORONARY ARTERY OF NATIVE HEART WITHOUT ANGINA PECTORIS: ICD-10-CM

## 2025-05-19 DIAGNOSIS — D86.2 SARCOIDOSIS OF LUNG WITH SARCOIDOSIS OF LYMPH NODES (MULTI): ICD-10-CM

## 2025-05-19 DIAGNOSIS — Z12.11 COLON CANCER SCREENING: ICD-10-CM

## 2025-05-19 DIAGNOSIS — I11.0 HYPERTENSIVE HEART DISEASE WITH HEART FAILURE: ICD-10-CM

## 2025-05-19 DIAGNOSIS — B02.29 POST HERPETIC NEURALGIA: ICD-10-CM

## 2025-05-19 PROCEDURE — 3008F BODY MASS INDEX DOCD: CPT | Performed by: NURSE PRACTITIONER

## 2025-05-19 PROCEDURE — 3725F SCREEN DEPRESSION PERFORMED: CPT | Performed by: NURSE PRACTITIONER

## 2025-05-19 PROCEDURE — 1036F TOBACCO NON-USER: CPT | Performed by: NURSE PRACTITIONER

## 2025-05-19 PROCEDURE — 99396 PREV VISIT EST AGE 40-64: CPT | Performed by: NURSE PRACTITIONER

## 2025-05-19 PROCEDURE — 3077F SYST BP >= 140 MM HG: CPT | Performed by: NURSE PRACTITIONER

## 2025-05-19 PROCEDURE — 3080F DIAST BP >= 90 MM HG: CPT | Performed by: NURSE PRACTITIONER

## 2025-05-19 RX ORDER — CAPSAICIN 0.03 G/100G
CREAM TOPICAL 2 TIMES DAILY
Qty: 56.6 G | Refills: 0 | Status: SHIPPED | OUTPATIENT
Start: 2025-05-19 | End: 2026-05-19

## 2025-05-19 RX ORDER — METOPROLOL SUCCINATE 200 MG/1
200 TABLET, EXTENDED RELEASE ORAL DAILY
Qty: 90 TABLET | Refills: 3 | Status: SHIPPED | OUTPATIENT
Start: 2025-05-19 | End: 2026-05-19

## 2025-05-19 ASSESSMENT — ENCOUNTER SYMPTOMS
DEPRESSION: 0
OCCASIONAL FEELINGS OF UNSTEADINESS: 0
LOSS OF SENSATION IN FEET: 0

## 2025-05-19 ASSESSMENT — PAIN SCALES - GENERAL: PAINLEVEL_OUTOF10: 8

## 2025-05-19 NOTE — PATIENT INSTRUCTIONS
Schedule Specialist follow up appointments    Health Maintenance  Continue to follow a healthy diet with fruits and vegetables at most meals.  Daily exercise is recommended as well as adding weights 3 times a week to maintain muscle mass and for bone health.  It is recommended you drink 6 to 8 glasses of water daily, more when you are exerting yourself or in hot weather.  Make sure you follow the health screening guidelines and keep up to date on your immunizations!

## 2025-05-19 NOTE — PROGRESS NOTES
"Subjective   Patient ID: Vladimir Forrester is a 59 y.o. male who presents for Annual Exam, Herpes Zoster (Feels burning through the arm), and Arm Pain.    HPI  Pleasant established 58 y/o male with PMH CKD IV, HLD, HTN, Cardiomyopathy, Combined CHF, CAD s/p PCI, previous PE, prior HFrEF now recovered to 50%, atrial fibrillation on Eliquis, Atrial flutter, prior episodes of RVR with recurrence off amiodarone, Hypothyroid, Lung nodules, Sarcoidosis, Shingles presents for Annual Exam     His wife Remedios is with him today  Last Annual 2023     Current concerns    Medication refills- wife reports he takes 5 medications, 12 listed in EMR, states has had it updated several times but \"things come back\". Will refer to Clinical Pharmacy for clarification. Per Remedios they are working on establishing specialists at the [facility] as it is closer to their home.    Post herpetic Neuralgia- shingles flare noted last month, week 6, eruption resolved, still with post herpetic pain to left arm, worse at night. Tried online product with no relief, Gabapentin not helping, will send capsaicin which we again discussed    Sarcoidosis- Nodules noted on CT 7/2023, referred to Pulmonology, underwent supraclavicular core biopsy which was positive for  \" FLORID GRANULOMATOUS LYMPHADENITIS\" suggestive for sarcoidosis. Currently he is following with [facility].     CKD IV- was following with [facility] in [place], Dr Nagy but felt it was too far to drive, no recent labs noted in chart, last visit 2023. Will draw today, referral placed    Afib, CHF, RVR- stable, denies chest pain, palpitation, no edema, briggs. Follows with Cardiology, Dr Paulson    Hypothyroid- Tsh today, currently levothyroxine, asymptomatic       Works in   2 adult Kids 26,30, 1 grand      Diet is described as semi healthy, wife cooks  Caffeine no  Water 2-3     Exercise walks every day     Non- smoker, quit 1997, " "prior 1-2 cigarettes per day x 2 years  Alcohol reports prior beer, quit 5 years ago      Vision Home   Needs Dental Home >10  Colonoscopy @ 49, refer     Family history non contributory  Mom  at age 95   Dad , bone cancer  1/2 siblings     Review of Systems  Review of Systems   Constitutional: HPI   HENT: Negative.     Respiratory: Negative.     Cardiovascular: Negative.    Gastrointestinal: Negative.    Genitourinary: Negative.    Musculoskeletal: HPI    Psychiatric/Behavioral: Negative.     All other systems reviewed and are negative.    .vsVisit Vitals  /90 (BP Location: Right arm, Patient Position: Sitting)   Pulse 78   Temp 36.6 °C (97.9 °F)   Ht 1.727 m (5' 8\")   Wt 80.3 kg (177 lb)   SpO2 98%   BMI 26.91 kg/m²   Smoking Status Former   BSA 1.96 m²         Objective   Physical Exam  Vitals reviewed.   Constitutional:       Appearance: Normal appearance.   HENT:      Head: Normocephalic and atraumatic.      Right Ear: Tympanic membrane, ear canal and external ear normal.      Left Ear: Tympanic membrane, ear canal and external ear normal.      Nose: Nose normal.      Mouth/Throat:      Pharynx: Oropharynx is clear.   Eyes:      Extraocular Movements: Extraocular movements intact.      Conjunctiva/sclera: Conjunctivae normal.      Pupils: Pupils are equal, round, and reactive to light.   Cardiovascular:      Rate and Rhythm: Normal rate. Rhythm regularly irregular.      Pulses: Normal pulses.      Heart sounds: Normal heart sounds.   Pulmonary:      Effort: Pulmonary effort is normal.      Breath sounds: Normal breath sounds. No wheezing, rhonchi or rales.   Abdominal:      General: Bowel sounds are normal. There is no distension.      Palpations: Abdomen is soft.      Tenderness: There is no abdominal tenderness.   Musculoskeletal:         General: No swelling or tenderness. Normal range of motion.      Right hand: Decreased strength.      Cervical back: Normal range of motion and " neck supple.      Right lower leg: No edema.      Left lower leg: No edema.      Comments: 2nd digit decreased ROM   Skin:     General: Skin is warm and dry.   Neurological:      General: No focal deficit present.      Mental Status: He is alert and oriented to person, place, and time.   Psychiatric:         Mood and Affect: Mood normal.         Behavior: Behavior normal.         Thought Content: Thought content normal.         Judgment: Judgment normal.         Assessment/Plan   Problem List Items Addressed This Visit       Coronary artery disease    Relevant Medications    metoprolol succinate XL (Toprol-XL) 200 mg 24 hr tablet    Other Relevant Orders    CBC    Comprehensive Metabolic Panel    Lipid Panel    RESOLVED: Atypical atrial flutter    Relevant Medications    metoprolol succinate XL (Toprol-XL) 200 mg 24 hr tablet    Subclinical hypothyroidism    Relevant Orders    TSH with reflex to Free T4 if abnormal    SCC (spinal cord compression) (Multi)    Post herpetic neuralgia    Relevant Medications    capsaicin (Zostrix) 0.025 % cream    Sarcoidosis of lung with sarcoidosis of lymph nodes (Multi)    Obstructive lung disease (Multi)    Pulmonary sarcoidosis (Multi)    Chronic kidney disease, stage 4 (severe) (Multi)    Relevant Orders    CBC    Comprehensive Metabolic Panel    Referral to Nephrology    Albumin-Creatinine Ratio, Urine Random    Chronic atrial fibrillation, unspecified (Multi)    Relevant Medications    metoprolol succinate XL (Toprol-XL) 200 mg 24 hr tablet    Hypertensive heart disease with heart failure    Relevant Medications    metoprolol succinate XL (Toprol-XL) 200 mg 24 hr tablet    Other Relevant Orders    CBC    Comprehensive Metabolic Panel    Acute systolic (congestive) heart failure    Relevant Medications    metoprolol succinate XL (Toprol-XL) 200 mg 24 hr tablet    Pulmonary hypertension, unspecified (Multi)    Annual physical exam - Primary    Relevant Medications    capsaicin  (Zostrix) 0.025 % cream    metoprolol succinate XL (Toprol-XL) 200 mg 24 hr tablet    Other Relevant Orders    Referral to Clinical Pharmacy    CBC    Comprehensive Metabolic Panel    Vitamin D 25-Hydroxy,Total (for eval of Vitamin D levels)    TSH with reflex to Free T4 if abnormal    Hemoglobin A1C    Lipid Panel    Referral to Nephrology    PSA, total and free    Albumin-Creatinine Ratio, Urine Random    Colonoscopy Screening; Average Risk Patient    Colon cancer screening    Relevant Orders    Colonoscopy Screening; Average Risk Patient     Other Visit Diagnoses         Chronic atrial fibrillation (Multi)        Relevant Medications    metoprolol succinate XL (Toprol-XL) 200 mg 24 hr tablet    Other Relevant Orders    CBC    Comprehensive Metabolic Panel

## 2025-05-20 LAB
ALBUMIN SERPL-MCNC: 3.7 G/DL (ref 3.6–5.1)
ALBUMIN/CREAT UR: 234 MG/G CREAT
ALP SERPL-CCNC: 77 U/L (ref 35–144)
ALT SERPL-CCNC: 15 U/L (ref 9–46)
ANION GAP SERPL CALCULATED.4IONS-SCNC: 7 MMOL/L (CALC) (ref 7–17)
AST SERPL-CCNC: 18 U/L (ref 10–35)
BILIRUB SERPL-MCNC: 0.6 MG/DL (ref 0.2–1.2)
BUN SERPL-MCNC: 43 MG/DL (ref 7–25)
CALCIUM SERPL-MCNC: 10.4 MG/DL (ref 8.6–10.3)
CHLORIDE SERPL-SCNC: 107 MMOL/L (ref 98–110)
CHOLEST SERPL-MCNC: 111 MG/DL
CHOLEST/HDLC SERPL: 3.3 (CALC)
CO2 SERPL-SCNC: 25 MMOL/L (ref 20–32)
CREAT SERPL-MCNC: 2.92 MG/DL (ref 0.7–1.35)
CREAT UR-MCNC: 115 MG/DL (ref 20–320)
EGFRCR SERPLBLD CKD-EPI 2021: 24 ML/MIN/1.73M2
ERYTHROCYTE [DISTWIDTH] IN BLOOD BY AUTOMATED COUNT: 14.7 % (ref 11–15)
EST. AVERAGE GLUCOSE BLD GHB EST-MCNC: 114 MG/DL
EST. AVERAGE GLUCOSE BLD GHB EST-SCNC: 6.3 MMOL/L
GLUCOSE SERPL-MCNC: 74 MG/DL (ref 65–99)
HBA1C MFR BLD: 5.6 %
HCT VFR BLD AUTO: 37.6 % (ref 38.5–50)
HDLC SERPL-MCNC: 34 MG/DL
HGB BLD-MCNC: 11.7 G/DL (ref 13.2–17.1)
LDLC SERPL CALC-MCNC: 57 MG/DL (CALC)
MCH RBC QN AUTO: 28.4 PG (ref 27–33)
MCHC RBC AUTO-ENTMCNC: 31.1 G/DL (ref 32–36)
MCV RBC AUTO: 91.3 FL (ref 80–100)
MICROALBUMIN UR-MCNC: 26.9 MG/DL
NONHDLC SERPL-MCNC: 77 MG/DL (CALC)
PLATELET # BLD AUTO: 295 THOUSAND/UL (ref 140–400)
PMV BLD REES-ECKER: 9.7 FL (ref 7.5–12.5)
POTASSIUM SERPL-SCNC: 4.2 MMOL/L (ref 3.5–5.3)
PROT SERPL-MCNC: 7.3 G/DL (ref 6.1–8.1)
RBC # BLD AUTO: 4.12 MILLION/UL (ref 4.2–5.8)
SODIUM SERPL-SCNC: 139 MMOL/L (ref 135–146)
TRIGL SERPL-MCNC: 114 MG/DL
TSH SERPL-ACNC: 2.95 MIU/L (ref 0.4–4.5)
WBC # BLD AUTO: 4.9 THOUSAND/UL (ref 3.8–10.8)

## 2025-05-29 ENCOUNTER — TELEMEDICINE (OUTPATIENT)
Dept: PHARMACY | Facility: HOSPITAL | Age: 60
End: 2025-05-29
Payer: COMMERCIAL

## 2025-05-29 DIAGNOSIS — Z00.00 ANNUAL PHYSICAL EXAM: ICD-10-CM

## 2025-05-29 RX ORDER — CLOPIDOGREL BISULFATE 75 MG/1
75 TABLET ORAL DAILY
COMMUNITY

## 2025-05-29 NOTE — PROGRESS NOTES
Clinical Pharmacy Appointment    Patient ID: Vladimir Forrester is a 59 y.o. male who presents for No chief complaint on file..    Pt is here for First appointment.      Referring Provider: Criselda Hawley,*  PCP: Criselda Hawley, QUINTIN-CNP   Last visit with PCP: 5/19/25   Next visit with PCP: 11/19/25      Subjective     HPI  COMPREHENSIVE MEDICATION REVIEW  The patient's current medication regimen was reviewed and discussed. The following medication-related problems were identified and addressed:     Medications patient reports no longer taking:  Albuterol - patient reports he was switched to dulera and is not to take the albuterol anymore   Amlodipine- not sure when or why but reports stopped by physician   Buspirone - believes was stopped because ineffective   Capsaicin cream- made shingles worse   Lasix -stopped because not needed  Gabapentin - only needed short term for shingles pain   Pantoprazole - not needed anymore    Duplicate Therapies:   None      Inappropriate Dose:   None     Geriatric Considerations:   None     Drug Interactions:   None     Other Concerns:   None        Drug Interactions  No relevant drug interactions were noted.    Medication System Management  Patient's preferred pharmacy: [facility] [address]  Adherence/Organization: No Concerns   Affordability/Accessibility: No concerns       Objective   Allergies[1]  Social History     Social History Narrative    Not on file      Medication Review  Current Outpatient Medications   Medication Instructions    albuterol 90 mcg/actuation inhaler 2 puffs, inhalation, Every 6 hours PRN    apixaban (ELIQUIS) 5 mg, oral, 2 times daily    clopidogrel (PLAVIX) 75 mg, Daily    Dulera 200-5 mcg/actuation inhaler 1 puff, 2 times daily    levothyroxine (SYNTHROID, LEVOXYL) 25 mcg, oral, Daily    metoprolol succinate XL (TOPROL-XL) 200 mg, oral, Daily    rosuvastatin (CRESTOR) 40 mg, oral, Daily      Vitals  BP Readings from Last 2 Encounters:    05/19/25 169/90   04/14/25 130/80     BMI Readings from Last 1 Encounters:   05/19/25 26.91 kg/m²      Labs  A1C  Lab Results   Component Value Date    HGBA1C 5.6 05/19/2025    HGBA1C 5.6 04/03/2024    HGBA1C 5.5 02/14/2023     BMP  Lab Results   Component Value Date    CALCIUM 10.4 (H) 05/19/2025     05/19/2025    K 4.2 05/19/2025    CO2 25 05/19/2025     05/19/2025    BUN 43 (H) 05/19/2025    CREATININE 2.92 (H) 05/19/2025    EGFR 24 (L) 05/19/2025     LFTs  Lab Results   Component Value Date    ALT 15 05/19/2025    AST 18 05/19/2025    ALKPHOS 77 05/19/2025    BILITOT 0.6 05/19/2025     FLP  Lab Results   Component Value Date    TRIG 114 05/19/2025    CHOL 111 05/19/2025    LDLF 45 07/01/2023    LDLCALC 57 05/19/2025    HDL 34 (L) 05/19/2025     Urine Microalbumin  Lab Results   Component Value Date    MICROALBCREA 234 (H) 05/19/2025     Weight Management  Wt Readings from Last 3 Encounters:   05/19/25 80.3 kg (177 lb)   04/14/25 78.3 kg (172 lb 9.6 oz)   11/01/24 81.7 kg (180 lb 3.2 oz)      There is no height or weight on file to calculate BMI.     Assessment/Plan   Problem List Items Addressed This Visit       Annual physical exam       Clinical Pharmacist follow-up: PRN, Telehealth visit    Continue all meds under the continuation of care with the referring provider and clinical pharmacy team.    Thank you,  Hien Reyes, PharmD  Clinical Pharmacist  [phone] (Office Phone)   [phone] (Fax)   [email or web address]     Verbal consent to manage patient's drug therapy was obtained from the patient. They were informed they may decline to participate or withdraw from participation in pharmacy services at any time.         [1]   Allergies  Allergen Reactions    Other Hives and Swelling     Candelario powdered drink

## 2025-06-02 DIAGNOSIS — E03.8 SUBCLINICAL HYPOTHYROIDISM: ICD-10-CM

## 2025-06-02 RX ORDER — LEVOTHYROXINE SODIUM 25 UG/1
25 TABLET ORAL DAILY
Qty: 30 TABLET | Refills: 11 | Status: SHIPPED | OUTPATIENT
Start: 2025-06-02 | End: 2026-06-02

## 2025-06-17 ENCOUNTER — APPOINTMENT (OUTPATIENT)
Dept: CARDIOLOGY | Facility: CLINIC | Age: 60
End: 2025-06-17
Payer: COMMERCIAL

## 2025-07-29 ENCOUNTER — APPOINTMENT (OUTPATIENT)
Dept: NEPHROLOGY | Facility: CLINIC | Age: 60
End: 2025-07-29
Payer: COMMERCIAL

## 2025-08-01 DIAGNOSIS — I48.4 ATYPICAL ATRIAL FLUTTER: ICD-10-CM

## 2025-08-01 DIAGNOSIS — I25.10 CORONARY ARTERY DISEASE INVOLVING NATIVE CORONARY ARTERY OF NATIVE HEART WITHOUT ANGINA PECTORIS: ICD-10-CM

## 2025-08-02 RX ORDER — ROSUVASTATIN CALCIUM 40 MG/1
40 TABLET, COATED ORAL DAILY
Qty: 30 TABLET | Refills: 3 | Status: SHIPPED | OUTPATIENT
Start: 2025-08-02 | End: 2025-09-01

## 2025-08-05 ENCOUNTER — APPOINTMENT (OUTPATIENT)
Dept: NEPHROLOGY | Facility: CLINIC | Age: 60
End: 2025-08-05
Payer: COMMERCIAL

## 2025-08-11 PROBLEM — E66.811 OBESITY, CLASS I, BMI 30-34.9: Status: RESOLVED | Noted: 2019-03-11 | Resolved: 2025-08-11

## 2025-08-11 PROBLEM — E66.3 OVERWEIGHT WITH BODY MASS INDEX (BMI) OF 27 TO 27.9 IN ADULT: Status: RESOLVED | Noted: 2023-03-20 | Resolved: 2025-08-11

## 2025-09-02 ENCOUNTER — APPOINTMENT (OUTPATIENT)
Dept: CARDIOLOGY | Facility: CLINIC | Age: 60
End: 2025-09-02
Payer: COMMERCIAL

## 2025-09-02 PROBLEM — I50.32 HEART FAILURE WITH RECOVERED EJECTION FRACTION (HFRECEF): Status: ACTIVE | Noted: 2023-05-18

## 2025-11-19 ENCOUNTER — APPOINTMENT (OUTPATIENT)
Dept: PRIMARY CARE | Facility: CLINIC | Age: 60
End: 2025-11-19
Payer: COMMERCIAL

## 2026-05-20 ENCOUNTER — APPOINTMENT (OUTPATIENT)
Dept: PRIMARY CARE | Facility: CLINIC | Age: 61
End: 2026-05-20
Payer: COMMERCIAL

## (undated) DEVICE — CATHETER, EAGLE EYE, PLATNIUM (IVUS)

## (undated) DEVICE — VALVE, HEMO, GUARDIAN II, W/GUIDEWIRE INSERTION TOOL & TORQUE

## (undated) DEVICE — CATHETER, OPTITORQUE, 6FR 110CM, TIGER RADIAL 4.0

## (undated) DEVICE — CATHETER, BALLOON, NC EUPHORA NONCOMPLIANT 4.0 X 12 X 142CM

## (undated) DEVICE — CATHETER KIT, ASPIRATION, 044 CORONARY SYSTEM, 140CM

## (undated) DEVICE — Device

## (undated) DEVICE — GUIDEWIRE, RUN THROUGH WIRE, 300CM

## (undated) DEVICE — SHEATH, GLIDESHEATH, SLENDER, 6FR 10CM

## (undated) DEVICE — CATHETER, GUIDING, LAUNCHER, 6FR AL 1.0

## (undated) DEVICE — GUIDEWIRE, ROADRUNNER, 0.035 IN X 180CM, STANDARD SHAFT, ANGLED TIP

## (undated) DEVICE — DEVICE KIT, INFLATION, CUSTOM, PARMA

## (undated) DEVICE — GUIDEWIRE, INQWIRE, 3MM J, .035 X 210CM, FIXED

## (undated) DEVICE — GUIDEWIRE, UNIVERSAL BALANCE MID WEIGHT

## (undated) DEVICE — BAND, VASCULAR, RADIAL HEMOSTAT, REGULAR 24CM

## (undated) DEVICE — CANISTER, INDIGO MAX, FOR PMXENGN, NON-STERILE